# Patient Record
Sex: FEMALE
[De-identification: names, ages, dates, MRNs, and addresses within clinical notes are randomized per-mention and may not be internally consistent; named-entity substitution may affect disease eponyms.]

---

## 2017-09-01 ENCOUNTER — HOSPITAL ENCOUNTER (INPATIENT)
Dept: HOSPITAL 14 - H.EROB2 | Age: 27
LOS: 2 days | Discharge: HOME | DRG: 373 | End: 2017-09-03
Attending: SPECIALIST | Admitting: SPECIALIST
Payer: COMMERCIAL

## 2017-09-01 VITALS — BODY MASS INDEX: 49.3 KG/M2

## 2017-09-01 DIAGNOSIS — Z3A.38: ICD-10-CM

## 2017-09-01 LAB
BASOPHILS # BLD AUTO: 0 K/UL (ref 0–0.2)
BASOPHILS NFR BLD: 0.3 % (ref 0–2)
EOSINOPHIL # BLD AUTO: 0 K/UL (ref 0–0.7)
EOSINOPHIL NFR BLD: 0.5 % (ref 0–4)
ERYTHROCYTE [DISTWIDTH] IN BLOOD BY AUTOMATED COUNT: 12.8 % (ref 11.5–14.5)
HCT VFR BLD CALC: 40.2 % (ref 34–47)
LYMPHOCYTES # BLD AUTO: 2.1 K/UL (ref 1–4.3)
LYMPHOCYTES NFR BLD AUTO: 23.8 % (ref 20–40)
MCH RBC QN AUTO: 31 PG (ref 27–31)
MCHC RBC AUTO-ENTMCNC: 32.8 G/DL (ref 33–37)
MCV RBC AUTO: 94.6 FL (ref 81–99)
MONOCYTES # BLD: 0.7 K/UL (ref 0–0.8)
MONOCYTES NFR BLD: 7.7 % (ref 0–10)
NEUTROPHILS # BLD: 5.9 K/UL (ref 1.8–7)
NEUTROPHILS NFR BLD AUTO: 67.7 % (ref 50–75)
NRBC BLD AUTO-RTO: 0.1 % (ref 0–0)
PLATELET # BLD: 196 K/UL (ref 130–400)
PMV BLD AUTO: 8.4 FL (ref 7.2–11.7)
WBC # BLD AUTO: 8.7 K/UL (ref 4.8–10.8)

## 2017-09-01 PROCEDURE — 4A1HXCZ MONITORING OF PRODUCTS OF CONCEPTION, CARDIAC RATE, EXTERNAL APPROACH: ICD-10-PCS | Performed by: SPECIALIST

## 2017-09-01 NOTE — OBADHP
===========================

Datetime: 09/01/2017 18:43

===========================

   

Admit Comment, IP Provider:  iup term srom in labor uneventful prenatal course admit for delivery

Pelvic Type - PN:  Adequate

Extremities - PN:  Normal

Abdomen - PN:  Normal

Back - PN:  Normal

Breast - PN:  Normal

Lungs - PN:  Normal

Heart - PN:  Normal

Thyroid - PN:  Normal

Neurologic - PN:  Normal

HEENT - PN:  Normal

General - PN:  Normal

Fetal Presentation-Admit:  Vertex

FHR - Baseline A Provider:  140

Gestation - Est Wks by US:  38+

Pool Provider:  Positive

IP Chief Complaint:  Uterine contractions

NICHD Variability Prov Fetus A:  Moderate 6-25bpm

NICHD Accel Fetus A IP Provider:  10X10

FHR Category Provider Fetus A:  Category I

NICHD Decel Fetus A IP Provider:  None

Dilatation, Provider:  3-4

Effacement, Provider:  75%

Station, Provider:  -1

Genitourinary Exam:  Normal

DTRs - PN:  Normal

EGA AdmitDate IP:  38.6

IP Adm Impression:  Term, intrauterine pregnancy; Ruptured Membranes

IP Admit Plan:  Admit to unit; Initiate labor protocol

## 2017-09-02 LAB
ERYTHROCYTE [DISTWIDTH] IN BLOOD BY AUTOMATED COUNT: 12.7 % (ref 11.5–14.5)
HCT VFR BLD CALC: 34.2 % (ref 34–47)
MCH RBC QN AUTO: 30.9 PG (ref 27–31)
MCHC RBC AUTO-ENTMCNC: 32.6 G/DL (ref 33–37)
MCV RBC AUTO: 94.6 FL (ref 81–99)
PLATELET # BLD: 176 K/UL (ref 130–400)
WBC # BLD AUTO: 13.4 K/UL (ref 4.8–10.8)

## 2017-09-02 RX ADMIN — PRENATAL VIT W/ FE FUMARATE-FA TAB 27-0.8 MG SCH TAB: 27-0.8 TAB at 08:24

## 2017-09-02 NOTE — OBPPN
===========================

Datetime: 09/02/2017 13:32

===========================

   

PP Pain Prov:  Within normal limits

PP Nausea Prov:  Denies

PP Flatus Prov:  Yes

PP BM Prov:  No

PP Breasts Prov:  Normal

PP Heart Prov:  Normal

PP Lungs Prov:  Normal

PP Abdomen/Uterus Prov:  Normal

PP Lochia Prov:  Normal

PP Vulva/Perineum Prov:  Normal

PP CVA Tenderness Prov:  Normal

PP Extremities Prov:  Normal

PP Breastfeeding Progress Prov:  Normal

PP Impression Prov:  Normal postpartum progression

PP Plan Prov:  Continue present management

PP Progress Note Prov:  stable ppd1 continue present care,may shower,regular diet

IP PP Procedures:  None

## 2017-09-03 VITALS
SYSTOLIC BLOOD PRESSURE: 130 MMHG | TEMPERATURE: 98.9 F | HEART RATE: 85 BPM | OXYGEN SATURATION: 97 % | DIASTOLIC BLOOD PRESSURE: 76 MMHG | RESPIRATION RATE: 20 BRPM

## 2017-09-03 LAB
ERYTHROCYTE [DISTWIDTH] IN BLOOD BY AUTOMATED COUNT: 12.9 % (ref 11.5–14.5)
HCT VFR BLD CALC: 37.7 % (ref 34–47)
MCH RBC QN AUTO: 31.9 PG (ref 27–31)
MCHC RBC AUTO-ENTMCNC: 33.7 G/DL (ref 33–37)
MCV RBC AUTO: 94.6 FL (ref 81–99)
PLATELET # BLD: 180 K/UL (ref 130–400)
WBC # BLD AUTO: 9.2 K/UL (ref 4.8–10.8)

## 2017-09-03 RX ADMIN — PRENATAL VIT W/ FE FUMARATE-FA TAB 27-0.8 MG SCH TAB: 27-0.8 TAB at 09:26

## 2017-09-03 NOTE — OBDCSUM
===========================

Datetime: 09/03/2017 10:47

===========================

   

Discharged to, Provider:  Home

Follow up at, Provider:  dr. SERRATO

Disch Instr Activity:  Bedrest; May be up to bathroom; May be up for meals; May Shower

Disch Instr Diet:  Regular

Discharge Instructions, Provider:  Routine instructions given

Discharge Diagnosis, Provider:  Term Pregnancy Delivered

Discharge Time:  09/03/2017 10:47

Follow up in weeks, Provider:  4-6 WKS

Disch Referrals:  None

Contraception discussed, Prov:  Yes

Disch Activity Restrictions:  No exercising; No lifting; No driving; Minimize walking; Minimize stair
-climbing; No sexual activity; Nothing in vagina - Hitchcock, tampons, douche

Discharge Comment, Provider:  CONTINUE PP CARE PELVIC AND BED REST AND CONTINUE pnc VIT AND IRON

Contraception after Delivery:  Undecided

## 2017-09-03 NOTE — OBPPN
===========================

Datetime: 09/03/2017 10:42

===========================

   

PP Pain Prov:  Within normal limits

PP Pain Prov comment:  No SOB, chest pains or leg pains

PP Nausea Prov:  Denies

PP Flatus Prov:  No

PP Breasts Prov:  Normal

PP Lungs Prov:  Normal

PP Abdomen/Uterus Prov:  Abnormal

PP Lochia Prov:  Normal

PP CVA Tenderness Prov:  Normal

PP Extremities Prov:  Abnormal

PP C/S Incision Prov:  Not Applicable

PP Breastfeeding Progress Prov:  Not Applicable

PP Comments Phys Exam Prov:  breast not engorged, NT, Abd soft not distended fundus firm below the um
b.  NT Ext mild edema bilaterally but no calf tenderness Skin changes from eczema.

PP Impression Prov:  Normal postpartum progression

PP Plan Prov:  Discharge

PP Progress Note Prov:  D/c home and appt to see dr Myers  in 4-6 wks continue PP care pelvic and bed r
est

IP PP Procedures:  None

Vital Signs Provider PP:  Reviewed

## 2018-03-31 ENCOUNTER — HOSPITAL ENCOUNTER (INPATIENT)
Dept: HOSPITAL 14 - H.ER | Age: 28
LOS: 5 days | Discharge: HOME | DRG: 494 | End: 2018-04-05
Attending: INTERNAL MEDICINE | Admitting: INTERNAL MEDICINE
Payer: MEDICAID

## 2018-03-31 VITALS — BODY MASS INDEX: 49.3 KG/M2

## 2018-03-31 DIAGNOSIS — F41.9: ICD-10-CM

## 2018-03-31 DIAGNOSIS — L40.9: ICD-10-CM

## 2018-03-31 DIAGNOSIS — G47.33: ICD-10-CM

## 2018-03-31 DIAGNOSIS — K80.00: Primary | ICD-10-CM

## 2018-03-31 DIAGNOSIS — Z85.72: ICD-10-CM

## 2018-03-31 DIAGNOSIS — E66.9: ICD-10-CM

## 2018-03-31 LAB
ALBUMIN SERPL-MCNC: 3.9 G/DL (ref 3.5–5)
ALBUMIN/GLOB SERPL: 1 {RATIO} (ref 1–2.1)
ALT SERPL-CCNC: 32 U/L (ref 9–52)
AST SERPL-CCNC: 32 U/L (ref 14–36)
BACTERIA #/AREA URNS HPF: (no result) /[HPF]
BASOPHILS # BLD AUTO: 0 K/UL (ref 0–0.2)
BASOPHILS NFR BLD: 0.3 % (ref 0–2)
BILIRUB UR-MCNC: NEGATIVE MG/DL
BUN SERPL-MCNC: 11 MG/DL (ref 7–17)
CALCIUM SERPL-MCNC: 9.2 MG/DL (ref 8.4–10.2)
COLOR UR: YELLOW
EOSINOPHIL # BLD AUTO: 0 K/UL (ref 0–0.7)
EOSINOPHIL NFR BLD: 0.4 % (ref 0–4)
ERYTHROCYTE [DISTWIDTH] IN BLOOD BY AUTOMATED COUNT: 11.5 % (ref 11.5–14.5)
GFR NON-AFRICAN AMERICAN: > 60
GLUCOSE UR STRIP-MCNC: (no result) MG/DL
HGB BLD-MCNC: 12.9 G/DL (ref 12–16)
LEUKOCYTE ESTERASE UR-ACNC: (no result) LEU/UL
LYMPHOCYTES # BLD AUTO: 2 K/UL (ref 1–4.3)
LYMPHOCYTES NFR BLD AUTO: 26.1 % (ref 20–40)
MCH RBC QN AUTO: 31.9 PG (ref 27–31)
MCHC RBC AUTO-ENTMCNC: 35.1 G/DL (ref 33–37)
MCV RBC AUTO: 90.7 FL (ref 81–99)
MONOCYTES # BLD: 0.6 K/UL (ref 0–0.8)
MONOCYTES NFR BLD: 8.1 % (ref 0–10)
NEUTROPHILS # BLD: 5.1 K/UL (ref 1.8–7)
NEUTROPHILS NFR BLD AUTO: 65.1 % (ref 50–75)
NRBC BLD AUTO-RTO: 0.1 % (ref 0–0)
PH UR STRIP: 6 [PH] (ref 5–8)
PLATELET # BLD: 260 K/UL (ref 130–400)
PMV BLD AUTO: 7.9 FL (ref 7.2–11.7)
PROT UR STRIP-MCNC: 30 MG/DL
RBC # BLD AUTO: 4.06 MIL/UL (ref 3.8–5.2)
RBC # UR STRIP: (no result) /UL
SP GR UR STRIP: 1.03 (ref 1–1.03)
SQUAMOUS EPITHIAL: 3 /HPF (ref 0–5)
URINE CLARITY: (no result)
UROBILINOGEN UR-MCNC: (no result) MG/DL (ref 0.2–1)
WBC # BLD AUTO: 7.8 K/UL (ref 4.8–10.8)

## 2018-03-31 NOTE — US
HISTORY:

RUQ pain



COMPARISON:

Correlations made to CT scan of the abdomen pelvis performed 

approximately 7 hours prior.



TECHNIQUE:

Sonographic evaluation of the right upper quadrant of the abdomen.



FINDINGS:



LIVER:

Mildly enlarged, measuring 17.1 cm in length. Normal echogenicity of 

the liver parenchyma.  No mass. No intrahepatic bile duct dilatation. 



GALLBLADDER:

Cholelithiasis without gallbladder wall thickening or edema.  

Sonographic Tovar's sign was elicited. 



COMMON BILE DUCT:

Measures 4 mm.  No stones. No dilatation.



PANCREAS:

Not well-visualized.



AORTA:

No aneurysmal dilatation.



IVC:

Unremarkable.



OTHER FINDINGS:

None .



IMPRESSION:

Cholelithiasis without gallbladder wall thickening/ edema or 

pericholecystic fluid.  Sonographic Tovar's sign was, however, 

elicited.  Findings are equivocal for acute cholecystitis, although 

recent CT scan of the abdomen pelvis did not reveal gallbladder wall 

thickening or pericholecystic edema/inflammation.  Nuclear medicine 

HIDA scan can be obtained to further evaluate patency of the cystic 

duct as clinically warranted.



Borderline hepatomegaly.

## 2018-03-31 NOTE — CP.PCM.CON
History of Present Illness





- History of Present Illness


History of Present Illness: 





General Surgery Consult Note for Dr. Chacko


Reason for Consult: Right flank/RUQ pain





28 F  with PMH of JOSEPHINE, psoriaisis, depression/anxiety presents to [facility] for 

complaint of Right flank/RUQ pain. Patient was seen and evaluated in the ED. 

Koki states that the pain began around 1 am, which woke her up from sleep. 

Patient reports that she ate dinner last night and felt fine before going to 

bed. She states that she has never has had pain like this before. She reports 

sudden onset. Upon arrival to ED, she states that she had an episode of nausea/

vomiting (non-bloody, non-bilious emesis). She rates that pain as severe. She 

describes pain as constant and sharp located in right flank radiating to RUQ. 

She reports that certain movements and palpatino exacerbates her pain while 

nothing alleviates it. Denies recent illness or sick contacts. Admits to 

chills. Denies suicidal/homicial ideations, fever/chills, palpitations, chest 

pain, SOB, diarrhea, constipation, or urinary symptoms.





PMD: Dr. Moses





PMH: JOSEPHINE, psoriaisis, depression/anxiety


Meds: Humera


Allergy: NKDA


PSH: lipoma removal


FH: non-contributory


Social: denies tobacco/EtOH/illicit drug use, lives with  and child





Review of Systems





- Review of Systems


All systems: reviewed and no additional remarkable complaints except (12 point 

ROS negative unless otherwise stated as per HPI)





Past Patient History





- Past Medical History & Family History


Past Medical History?: Yes





- Past Social History


Alcohol: Occasional


Drugs: Denies





- CARDIAC


Hx Hypertension: No





- PULMONARY


Hx Sleep Apnea: Yes





- NEUROLOGICAL


Hx Neurological Disorder: No





- HEENT


Other/Comment: Use Eyeglasses for reading





- RENAL


Hx Chronic Kidney Disease: No





- ENDOCRINE/METABOLIC


Hx Endocrine Disorders: Yes (thyroid issue, no meds)





- HEMATOLOGICAL/ONCOLOGICAL


Hx Blood Disorders: No





- INTEGUMENTARY


Hx Psoriasis: Yes





- MUSCULOSKELETAL/RHEUMATOLOGICAL


Hx Musculoskeletal Disorders: No


Hx Falls: No





- GASTROINTESTINAL


Hx Gastrointestinal Disorders: No





- GENITOURINARY/GYNECOLOGICAL


Hx Genitourinary Disorders: No





- PSYCHIATRIC


Hx Depression: No





- SURGICAL HISTORY


Hx Surgeries: Yes (lipoma removal from head)


Other/Comment: Removal of cyst on the tailbone X2





- ANESTHESIA


Hx Anesthesia: Yes





Meds


Allergies/Adverse Reactions: 


 Allergies











Allergy/AdvReac Type Severity Reaction Status Date / Time


 


No Known Allergies Allergy   Verified 17 07:25














Physical Exam





- Constitutional


Appears: Non-toxic, No Acute Distress





- Head Exam


Head Exam: ATRAUMATIC, NORMOCEPHALIC





- Eye Exam


Eye Exam: EOMI, Normal appearance


Pupil Exam: PERRL





- ENT Exam


ENT Exam: Mucous Membranes Moist





- Neck Exam


Neck exam: Positive for: Full Rom.  Negative for: Tenderness





- Respiratory Exam


Respiratory Exam: NORMAL BREATHING PATTERN





- Cardiovascular Exam


Cardiovascular Exam: REGULAR RHYTHM





- GI/Abdominal Exam


GI & Abdominal Exam: Normal Bowel Sounds, Soft, Tenderness (RUQ).  absent: 

Distended, Firm, Guarding, Hernia, Rebound, Rigid





- Extremities Exam


Extremities exam: Positive for: normal capillary refill, pedal pulses present.  

Negative for: calf tenderness





- Back Exam


Back exam: absent: CVA tenderness (L), CVA tenderness (R)





- Neurological Exam


Neurological exam: Alert, CN II-XII Intact, Oriented x3





- Psychiatric Exam


Psychiatric exam: Normal Affect, Normal Mood





- Skin


Skin Exam: Dry, Intact, Normal Color, Warm





Results





- Vital Signs


Recent Vital Signs: 


 Last Vital Signs











Temp  98.3 F   18 02:29


 


Pulse  79   18 02:29


 


Resp  18   18 02:29


 


BP  119/69   18 02:29


 


Pulse Ox  99   18 04:50














- Labs


Result Diagrams: 


 18 03:00





 18 03:00


Labs: 


 Laboratory Results - last 24 hr











  18





  03:00 03:00 03:00


 


WBC  7.8  


 


RBC  4.06  


 


Hgb  12.9  


 


Hct  36.8  


 


MCV  90.7  D  


 


MCH  31.9 H  


 


MCHC  35.1  


 


RDW  11.5  


 


Plt Count  260  


 


MPV  7.9  


 


Neut % (Auto)  65.1  


 


Lymph % (Auto)  26.1  


 


Mono % (Auto)  8.1  


 


Eos % (Auto)  0.4  


 


Baso % (Auto)  0.3  


 


Neut # (Auto)  5.1  


 


Lymph # (Auto)  2.0  


 


Mono # (Auto)  0.6  


 


Eos # (Auto)  0.0  


 


Baso # (Auto)  0.0  


 


D-Dimer, Quantitative    247 H


 


Sodium   142 


 


Potassium   3.8 


 


Chloride   101 


 


Carbon Dioxide   26 


 


Anion Gap   19 


 


BUN   11 


 


Creatinine   0.8 


 


Est GFR ( Amer)   > 60 


 


Est GFR (Non-Af Amer)   > 60 


 


Random Glucose   125 H 


 


Calcium   9.2 


 


Phosphorus   3.1 


 


Magnesium   1.8 


 


Total Bilirubin   0.5 


 


AST   32 


 


ALT   32 


 


Alkaline Phosphatase   81 


 


Total Protein   7.8 


 


Albumin   3.9 


 


Globulin   3.9 


 


Albumin/Globulin Ratio   1.0 


 


Urine Opiates Screen   


 


Urine Methadone Screen   


 


Ur Barbiturates Screen   


 


Ur Phencyclidine Scrn   


 


Ur Amphetamines Screen   


 


U Benzodiazepines Scrn   


 


U Oth Cocaine Metabols   


 


U Cannabinoids Screen   


 


Alcohol, Quantitative   < 10 














  18





  03:00


 


WBC 


 


RBC 


 


Hgb 


 


Hct 


 


MCV 


 


MCH 


 


MCHC 


 


RDW 


 


Plt Count 


 


MPV 


 


Neut % (Auto) 


 


Lymph % (Auto) 


 


Mono % (Auto) 


 


Eos % (Auto) 


 


Baso % (Auto) 


 


Neut # (Auto) 


 


Lymph # (Auto) 


 


Mono # (Auto) 


 


Eos # (Auto) 


 


Baso # (Auto) 


 


D-Dimer, Quantitative 


 


Sodium 


 


Potassium 


 


Chloride 


 


Carbon Dioxide 


 


Anion Gap 


 


BUN 


 


Creatinine 


 


Est GFR ( Amer) 


 


Est GFR (Non-Af Amer) 


 


Random Glucose 


 


Calcium 


 


Phosphorus 


 


Magnesium 


 


Total Bilirubin 


 


AST 


 


ALT 


 


Alkaline Phosphatase 


 


Total Protein 


 


Albumin 


 


Globulin 


 


Albumin/Globulin Ratio 


 


Urine Opiates Screen  Negative


 


Urine Methadone Screen  Negative


 


Ur Barbiturates Screen  Negative


 


Ur Phencyclidine Scrn  Negative


 


Ur Amphetamines Screen  Negative


 


U Benzodiazepines Scrn  Negative


 


U Oth Cocaine Metabols  Negative


 


U Cannabinoids Screen  Negative


 


Alcohol, Quantitative 














Assessment & Plan





- Assessment and Plan (Free Text)


Assessment: 





28 F with right flank/RUQ abdominal pain; CT abd/pelvis demonstrates suspicion 

for SBO


Plan: 





-NPO


-IV fluids


-f/u RUQ US


-f/u Renal US


-Analgesics/Anti-emetics PRN


-Patient refuses NGT at this time


-Recommend Psych consult


-Further recommendations as per Dr. Ricco Parker PGY1

## 2018-03-31 NOTE — ED PDOC
HPI: Back


Time Seen by Provider: 03/31/18 02:29


Chief Complaint (Nursing): Back Pain


Chief Complaint (Provider): right flank pain


History Per: Patient


History/Exam Limitations: no limitations


Onset/Duration Of Symptoms: Hrs (x2), Sudden Onset


Current Symptoms Are (Timing): Still Present


Quality Of Discomfort: Sharp


Additional Complaint(s): 


Rebecca Gilbert is a 28 year old female with a past medical history of 

psoriasis, lipoma, and pilonidal abscess, who is presenting to the ER with 

complaints of sudden onset, sharp, non-radiating right flank pain, onset at 1 

am this morning while patient was sleeping. Patient states that she fekt fine 

when she went to bed last night but awoke with the pain. She reports that the 

pain is worsened with certain movements of the trunk and notes associated 

nausea. Patient denies any vomiting, diarrhea, or urinary symptoms. 





PMD: Dr. Bordie Moses











Past Medical History


Reviewed: Historical Data, Nursing Documentation, Vital Signs


Vital Signs: 


 Last Vital Signs











Temp  98.3 F   03/31/18 02:29


 


Pulse  79   03/31/18 02:29


 


Resp  18   03/31/18 02:29


 


BP  119/69   03/31/18 02:29


 


Pulse Ox  99   03/31/18 02:29














- Medical History


PMH: Sleep Apnea


   Denies: Depression, Diabetes, HTN, Chronic Kidney Disease


Other PMH: psoriasis, pilonidal abscess, lipoma





- Surgical History


Surgical History: No Surg Hx





- Family History


Family History: States: Unknown Family Hx





- Social History


Current smoker - smoking cessation education provided: No


Alcohol: Occasional


Drugs: Denies





- Home Medications


Home Medications: 


 Ambulatory Orders











 Medication  Instructions  Recorded


 


Pantoprazole Sodium [Protonix] 40 mg PO DAILY #14 ect 04/05/18


 


oxyCODONE/Acetaminophen [Percocet 1 tab PO Q4 PRN #14 tab 04/05/18





5/325 mg Tab]  














- Allergies


Allergies/Adverse Reactions: 


 Allergies











Allergy/AdvReac Type Severity Reaction Status Date / Time


 


No Known Allergies Allergy   Verified 04/04/18 11:54














Review of Systems


ROS Statement: Except As Marked, All Systems Reviewed And Found Negative


Gastrointestinal: Positive for: Nausea.  Negative for: Vomiting, Diarrhea


Genitourinary Female: Negative for: Other (urinary symptoms)


Musculoskeletal: Positive for: Back Pain (right flank)


Skin: Positive for: Rash (from psoriasis)





Physical Exam





- Reviewed


Nursing Documentation Reviewed: Yes


Vital Signs Reviewed: Yes





- Physical Exam


Appears: Positive for: Uncomfortable, In Acute Distress (painful)


Head Exam: Positive for: ATRAUMATIC, NORMOCEPHALIC


Skin: Positive for: Warm, Dry, Rash (diffuse psoriatic lesions)


Eye Exam: Positive for: EOMI, PERRL


ENT: Negative for: Pharyngeal Erythema, Tonsillar Exudate


Neck: Positive for: Painless ROM


Cardiovascular/Chest: Positive for: Regular Rate, Rhythm.  Negative for: Murmur


Respiratory: Positive for: Normal Breath Sounds.  Negative for: Wheezing


Gastrointestinal/Abdominal: Positive for: Normal Exam, Soft, Other (obese).  

Negative for: Tenderness, Mass, Distended, Guarding, Rebound


Back: Negative for: L CVA Tenderness, R CVA Tenderness, Other (bony tenderness 

to palpation)


Extremity: Positive for: Normal ROM.  Negative for: Deformity


Lymphatic: Negative for: Adenopathy


Neurologic/Psych: Positive for: Alert.  Negative for: Motor/Sensory Deficits





- Laboratory Results


Result Diagrams: 


 04/02/18 08:00





 04/05/18 05:30


Urine dip results: Positive for: Blood (trace lysed).  Negative for: Leukocyte 

Esterase, Nitrate, Ketones, Glucose, Bilirubin, Protein





- ECG


O2 Sat by Pulse Oximetry: 99 (RA)


Pulse Ox Interpretation: Normal





Medical Decision Making


Medical Decision Making: 


Time: 2:47


Impression: right flank pain, vomiting


Differentials (including but not limited to): renal polyp, pyelonephritis, 

musculoskeletal strain, gall bladder disease, PE


Plan:


--CT Abd & Pelvis


--Alcohol Serum


--CMP


--Drug Screen


--Magnesium


--Phosphorous


--ED Urine Pregnancy


--ED Urine Dipstick


--CBC


--D Dimer


--Toradol 30 mg IV


--Zofran 4 mg PO


 EXAM:


CT Abdomen and Pelvis Without Intravenous Contrast


CLINICAL HISTORY:


28 years old, female; Pain; Abdominal pain; Flank; Right; Additional info: 

Right flank pain


TECHNIQUE:


Axial computed tomography images of the abdomen and pelvis without intravenous 

contrast. All CT


scans at this facility use one or more dose reduction techniques, viz.: 

automated exposure control;


ma/kV adjustment per patient size (including targeted exams where dose is 

matched to indication; i.e.


head); or iterative reconstruction technique. 679 images are submitted.Sagittal 

, axial and coronal


MPR reformatted images are submitted.


COMPARISON:


PELVIS/TRANSVAG US 2016-02-05 15:45


FINDINGS:


Lung bases: There is bibasilar atelectasis.


Mediastinum: Small hiatal hernia.


ABDOMEN:


Liver: Unremarkable.


Gallbladder and bile ducts: Partially distended gallbladder which is folded on 

itself.


Pancreas: Unremarkable. No ductal dilation.


Spleen: Unremarkable. No splenomegaly.


Adrenals: Unremarkable. No mass.


Kidneys and ureters: Unremarkable. No obstructing stones. No hydronephrosis.


Stomach and bowel: There are dilated mid small bowel loops with transition in 

the mid lower


abdomen below the umbilicus seen on image 127 series 3 suspicious for small 

bowel obstruction.


Duodenal diverticulum. Moderate amount of stool in the colon. Rectosigmoid 

distention with stool


and colonic thickening.Correlation with patient's clinical history of 

constipation versus stool related


colitis versus under distention is recommended. Diverticulosis.


Appendix: Suboptimally seen normal appendix.


PELVIS:


Bladder: Partially decompressed bladder with bladder wall thickening. 

Correlation with urinalysis is


recommended only if clinical cystitis is suspected.


Reproductive: High riding ovaries. Uterus is seen.


ABDOMEN and PELVIS:


Intraperitoneal space: Unremarkable. No free air. No significant fluid 

collection.


Bones/joints: Right sided thoracic and left-sided lower thoracic scoliosis. No 

acute fracture. No


dislocation.


Soft tissues: There is a fat-containing umbilical hernia.


Vasculature: Unremarkable. No abdominal aortic aneurysm.


Lymph nodes: Unremarkable. No enlarged lymph nodes.


Other findings: CRITICAL RESULT: The study was personally discussed on the 

telephone with Rosalind Graves on 3/31/2018 4:37 AM EDT. The results were understood and 

acknowledged.


IMPRESSION:


There are dilated mid small bowel loops with transition in the mid lower 

abdomen below the


umbilicus seen on image 127 series 3 suspicious for small bowel obstruction.


Thank you for allowing us to participate in the care of your patient.


Dictated and Authenticated by: Radha Ascencio MD


03/31/2018 4:39 AM Eastern Time ([place] & [place])


 


On reeval pt reports pain to RIGHT flank improved, but has RUQ ttp on exam. And 

reports that the RUQ palpation suddenly made her stomach "feel weird."


Will consult Surgery and place in observation for SBO.





--------------------------------------------------------------------------------

-----------------


Scribe Attestation:


Documented by Melissa Anguiano acting as a scribe for Rosalind Robin MD.


   


MD Scribe Attestation:


All medical record entries made by the Scribe were at my direction and 

personally dictated by me. I have reviewed the chart and agree that the record 

accurately reflects my personal performance of the history, physical exam, 

medical decision making, and the department course for this patient. I have 

also personally directed, reviewed, and agree with the discharge instructions 

and disposition.











Disposition





- Clinical Impression


Clinical Impression: 


 SBO (small bowel obstruction)





Discussed With DrNarendra: Ibeth Wharton


Doctor Will See Patient In The: Hospital


Counseled Patient/Family Regarding: Studies Performed, Diagnosis





- Disposition


Disposition Time: 06:00


Condition: FAIR





- Pt Status Changed To:


Hospital Disposition Of: Observation





- POA


Present On Arrival: None

## 2018-03-31 NOTE — CP.PCM.HP
History of Present Illness





- History of Present Illness


History of Present Illness: 


Cc: Right flank pain





 A 28 year old female with a pmhx of psoriasis, lipoma, and pilonidal abscess 

who presented to the ED with complaints of sudden onset of sharp pain to her 

right flank that woke her up at about 1 am this morning. States the pain is non-

radiating, constant and severe with a 8/10 intensity. States she felt fine when 

she went to bed last night but then woke up with the pain. Reports that pain is 

worsened with certain movements of her body and sometimes associated nausea. 

Denies any vomiting, diarrhea, urinary symptoms, fever, headache, dizziness, 

chest pain or shortness of breath. 








Present on Admission





- Present on Admission


Any Indicators Present on Admission: No





Review of Systems





- Review of Systems


All systems: reviewed and no additional remarkable complaints except (as stated)





- Constitutional


Constitutional: As Per HPI





- Cardiovascular


Cardiovascular: As Per HPI





- Respiratory


Respiratory: As Per HPI





- Gastrointestinal


Gastrointestinal: As Per HPI, Abdominal Pain, Nausea





Past Patient History





- Infectious Disease


Hx of Infectious Diseases: None





- Past Medical History & Family History


Past Medical History?: Yes


Past Family History: Reviewed and not pertinent





- Past Social History


Smoking Status: Never Smoked





- CARDIAC


Hx Hypertension: No





- PULMONARY


Hx Sleep Apnea: Yes





- NEUROLOGICAL


Hx Neurological Disorder: No





- HEENT


Hx HEENT Problems: No


Other/Comment: Use Eyeglasses for reading





- RENAL


Hx Chronic Kidney Disease: No





- ENDOCRINE/METABOLIC


Hx Endocrine Disorders: Yes (thyroid issue, no meds)





- HEMATOLOGICAL/ONCOLOGICAL


Hx Blood Disorders: No





- INTEGUMENTARY


Hx Psoriasis: Yes





- MUSCULOSKELETAL/RHEUMATOLOGICAL


Hx Musculoskeletal Disorders: No


Hx Falls: No





- GASTROINTESTINAL


Hx Gastrointestinal Disorders: No





- GENITOURINARY/GYNECOLOGICAL


Hx Genitourinary Disorders: No





- PSYCHIATRIC


Hx Depression: No


Hx Substance Use: No





- SURGICAL HISTORY


Hx Surgeries: Yes (lipoma removal from head)


Other/Comment: Removal of cyst on the tailbone X2





- ANESTHESIA


Hx Anesthesia: Yes





Meds


Allergies/Adverse Reactions: 


 Allergies











Allergy/AdvReac Type Severity Reaction Status Date / Time


 


No Known Allergies Allergy   Verified 09/01/17 07:25














Physical Exam





- Constitutional


Appears: Well, No Acute Distress





- Head Exam


Head Exam: ATRAUMATIC, NORMOCEPHALIC





- Eye Exam


Eye Exam: EOMI, Normal appearance, PERRL


Pupil Exam: NORMAL ACCOMODATION





- ENT Exam


ENT Exam: Mucous Membranes Moist, Normal Exam





- Neck Exam


Neck exam: Positive for: Normal Inspection





- Respiratory Exam


Respiratory Exam: Clear to Auscultation Bilateral, NORMAL BREATHING PATTERN





- Cardiovascular Exam


Cardiovascular Exam: REGULAR RHYTHM, +S1, +S2





- GI/Abdominal Exam


GI & Abdominal Exam: Normal Bowel Sounds, Soft





- Extremities Exam


Extremities exam: Positive for: normal inspection





- Back Exam


Back exam: NORMAL INSPECTION





- Neurological Exam


Neurological exam: Alert, CN II-XII Intact, Oriented x3, Reflexes Normal





- Psychiatric Exam


Psychiatric exam: Normal Affect, Normal Mood





- Skin


Skin Exam: Normal Color, Warm





Results





- Vital Signs


Recent Vital Signs: 





 Last Vital Signs











Temp  97.9 F   03/31/18 10:59


 


Pulse  72   03/31/18 11:30


 


Resp  18   03/31/18 11:30


 


BP  132/78   03/31/18 10:59


 


Pulse Ox  100   03/31/18 11:30














- Labs


Result Diagrams: 


 04/02/18 08:00





 04/03/18 09:05


Labs: 





 Laboratory Results - last 24 hr











  03/31/18 03/31/18 03/31/18





  03:00 03:00 03:00


 


WBC  7.8  


 


RBC  4.06  


 


Hgb  12.9  


 


Hct  36.8  


 


MCV  90.7  D  


 


MCH  31.9 H  


 


MCHC  35.1  


 


RDW  11.5  


 


Plt Count  260  


 


MPV  7.9  


 


Neut % (Auto)  65.1  


 


Lymph % (Auto)  26.1  


 


Mono % (Auto)  8.1  


 


Eos % (Auto)  0.4  


 


Baso % (Auto)  0.3  


 


Neut # (Auto)  5.1  


 


Lymph # (Auto)  2.0  


 


Mono # (Auto)  0.6  


 


Eos # (Auto)  0.0  


 


Baso # (Auto)  0.0  


 


D-Dimer, Quantitative    247 H


 


Sodium   142 


 


Potassium   3.8 


 


Chloride   101 


 


Carbon Dioxide   26 


 


Anion Gap   19 


 


BUN   11 


 


Creatinine   0.8 


 


Est GFR ( Amer)   > 60 


 


Est GFR (Non-Af Amer)   > 60 


 


Random Glucose   125 H 


 


Calcium   9.2 


 


Phosphorus   3.1 


 


Magnesium   1.8 


 


Total Bilirubin   0.5 


 


AST   32 


 


ALT   32 


 


Alkaline Phosphatase   81 


 


Total Protein   7.8 


 


Albumin   3.9 


 


Globulin   3.9 


 


Albumin/Globulin Ratio   1.0 


 


Urine Color   


 


Urine Clarity   


 


Urine pH   


 


Ur Specific Gravity   


 


Urine Protein   


 


Urine Glucose (UA)   


 


Urine Ketones   


 


Urine Blood   


 


Urine Nitrate   


 


Urine Bilirubin   


 


Urine Urobilinogen   


 


Ur Leukocyte Esterase   


 


Urine RBC (Auto)   


 


Urine Microscopic WBC   


 


Ur Squamous Epith Cells   


 


Urine Bacteria   


 


Urine Opiates Screen   


 


Urine Methadone Screen   


 


Ur Barbiturates Screen   


 


Ur Phencyclidine Scrn   


 


Ur Amphetamines Screen   


 


U Benzodiazepines Scrn   


 


U Oth Cocaine Metabols   


 


U Cannabinoids Screen   


 


Alcohol, Quantitative   < 10 














  03/31/18 03/31/18





  03:00 06:17


 


WBC  


 


RBC  


 


Hgb  


 


Hct  


 


MCV  


 


MCH  


 


MCHC  


 


RDW  


 


Plt Count  


 


MPV  


 


Neut % (Auto)  


 


Lymph % (Auto)  


 


Mono % (Auto)  


 


Eos % (Auto)  


 


Baso % (Auto)  


 


Neut # (Auto)  


 


Lymph # (Auto)  


 


Mono # (Auto)  


 


Eos # (Auto)  


 


Baso # (Auto)  


 


D-Dimer, Quantitative  


 


Sodium  


 


Potassium  


 


Chloride  


 


Carbon Dioxide  


 


Anion Gap  


 


BUN  


 


Creatinine  


 


Est GFR ( Amer)  


 


Est GFR (Non-Af Amer)  


 


Random Glucose  


 


Calcium  


 


Phosphorus  


 


Magnesium  


 


Total Bilirubin  


 


AST  


 


ALT  


 


Alkaline Phosphatase  


 


Total Protein  


 


Albumin  


 


Globulin  


 


Albumin/Globulin Ratio  


 


Urine Color   Yellow


 


Urine Clarity   Slighty-cloudy


 


Urine pH   6.0


 


Ur Specific Gravity   1.028


 


Urine Protein   30


 


Urine Glucose (UA)   Neg


 


Urine Ketones   Negative


 


Urine Blood   Small


 


Urine Nitrate   Negative


 


Urine Bilirubin   Negative


 


Urine Urobilinogen   0.2-1.0


 


Ur Leukocyte Esterase   Neg


 


Urine RBC (Auto)   5 H


 


Urine Microscopic WBC   6 H


 


Ur Squamous Epith Cells   3


 


Urine Bacteria   Rare


 


Urine Opiates Screen  Negative 


 


Urine Methadone Screen  Negative 


 


Ur Barbiturates Screen  Negative 


 


Ur Phencyclidine Scrn  Negative 


 


Ur Amphetamines Screen  Negative 


 


U Benzodiazepines Scrn  Negative 


 


U Oth Cocaine Metabols  Negative 


 


U Cannabinoids Screen  Negative 


 


Alcohol, Quantitative  














- Imaging and Cardiology


  ** CT Abd/Pelvis


Additional comment: 





PROCEDURE:  CT Abdomen and Pelvis without intravenous contrast





HISTORY:


RIGHT FLANK PAIN





COMPARISON:


None.





TECHNIQUE:


Contiguous images were obtained from the domes of the diaphragms to the upper 

thighs without the administration of intravenous contrast. Oral contrast was 

not administered.





Radiation dose: 





Total exam DLP = 1154.3 mGy-cm.





This CT exam was performed using one or more of the following dose reduction 

techniques: Automated exposure control, adjustment of the mA and/or kV 

according to patient size, and/or use of iterative reconstruction technique.





FINDINGS:





LOWER THORAX:


Unremarkable. 





LIVER:


Unremarkable. No gross lesion or ductal dilatation.  





GALLBLADDER AND BILE DUCTS:


Unremarkable. 





PANCREAS:


Unremarkable. No gross lesion or ductal dilatation.





SPLEEN:


Unremarkable. 





ADRENALS:


Unremarkable. No mass. 





KIDNEYS AND URETERS:


Unremarkable. No hydronephrosis. No solid mass. 





VASCULATURE:


Unremarkable. No aortic aneurysm. 





BOWEL:


Unremarkable. No obstruction. No gross mural thickening. 





APPENDIX:


Unremarkable. Normal appendix. 





PERITONEUM:


Unremarkable. No free fluid. No free air. 





LYMPH NODES:


Unremarkable. No enlarged lymph nodes. 





BLADDER:


Unremarkable. 





REPRODUCTIVE:


Unremarkable. 





BONES:


No acute fracture. 





OTHER FINDINGS:


None.





IMPRESSION:


No acute abdominal pelvic pathology.





There is no evidence of small-bowel obstruction. Small bowel loops are 

nondilated and are normal in caliber. Findings are discordant with the 

preliminary interpretation provided by teleradiology.











Assessment & Plan





- Assessment and Plan (Free Text)


Assessment: 





28 year old female with right flank pain


Plan: 





NPO


IVF


renal us


gallbladder us


pain medication


surgery consult


GI prophylaxis


elevated LFTs, trend labs

## 2018-03-31 NOTE — CT
PROCEDURE:  CT Abdomen and Pelvis without intravenous contrast



HISTORY:

RIGHT FLANK PAIN



COMPARISON:

None.



TECHNIQUE:

Contiguous images were obtained from the domes of the diaphragms to 

the upper thighs without the administration of intravenous contrast. 

Oral contrast was not administered.



Radiation dose: 



Total exam DLP = 1154.3 mGy-cm.



This CT exam was performed using one or more of the following dose 

reduction techniques: Automated exposure control, adjustment of the 

mA and/or kV according to patient size, and/or use of iterative 

reconstruction technique.



FINDINGS:



LOWER THORAX:

Unremarkable. 



LIVER:

Unremarkable. No gross lesion or ductal dilatation.  



GALLBLADDER AND BILE DUCTS:

Unremarkable. 



PANCREAS:

Unremarkable. No gross lesion or ductal dilatation.



SPLEEN:

Unremarkable. 



ADRENALS:

Unremarkable. No mass. 



KIDNEYS AND URETERS:

Unremarkable. No hydronephrosis. No solid mass. 



VASCULATURE:

Unremarkable. No aortic aneurysm. 



BOWEL:

Unremarkable. No obstruction. No gross mural thickening. 



APPENDIX:

Unremarkable. Normal appendix. 



PERITONEUM:

Unremarkable. No free fluid. No free air. 



LYMPH NODES:

Unremarkable. No enlarged lymph nodes. 



BLADDER:

Unremarkable. 



REPRODUCTIVE:

Unremarkable. 



BONES:

No acute fracture. 



OTHER FINDINGS:

None.



IMPRESSION:

No acute abdominal pelvic pathology.



There is no evidence of small-bowel obstruction. Small bowel loops 

are nondilated and are normal in caliber. Findings are discordant 

with the preliminary interpretation provided by teleradiology.

## 2018-03-31 NOTE — US
PROCEDURE:  Ultrasound of the Kidneys



HISTORY:

r flank pain



COMPARISON:

Correlation is made to CT scan of the abdomen and pelvis performed 

approximately 7 hours prior.



TECHNIQUE:

Sonogram of the kidneys.



FINDINGS:



RIGHT KIDNEY:

Measures: 13.4 x 4.6 x 4.8 cm. 



Normal in size, contour and echogenicity. 



No stone, solid mass lesion or hydronephrosis visualized. 



LEFT KIDNEY:

Measures: 11.4 x 4.3 x 4.7 cm. 



Normal in size, contour and echogenicity. 



No stone, solid mass lesion or hydronephrosis visualized. 



OTHER FINDINGS:

None.



IMPRESSION:

Unremarkable renal sonogram.

## 2018-04-01 LAB
ALBUMIN SERPL-MCNC: 3.5 G/DL (ref 3.5–5)
ALBUMIN/GLOB SERPL: 0.9 {RATIO} (ref 1–2.1)
ALT SERPL-CCNC: 159 U/L (ref 9–52)
APTT BLD: 29.7 SECONDS (ref 25.6–37.1)
AST SERPL-CCNC: 177 U/L (ref 14–36)
BUN SERPL-MCNC: 6 MG/DL (ref 7–17)
CALCIUM SERPL-MCNC: 8.9 MG/DL (ref 8.4–10.2)
ERYTHROCYTE [DISTWIDTH] IN BLOOD BY AUTOMATED COUNT: 11.6 % (ref 11.5–14.5)
GFR NON-AFRICAN AMERICAN: > 60
HGB BLD-MCNC: 13.6 G/DL (ref 12–16)
INR PPP: 1.1 (ref 0.9–1.2)
MCH RBC QN AUTO: 30.9 PG (ref 27–31)
MCHC RBC AUTO-ENTMCNC: 33.9 G/DL (ref 33–37)
MCV RBC AUTO: 91.1 FL (ref 81–99)
PLATELET # BLD: 265 K/UL (ref 130–400)
PROTHROMBIN TIME: 12.7 SECONDS (ref 9.8–13.1)
RBC # BLD AUTO: 4.4 MIL/UL (ref 3.8–5.2)
WBC # BLD AUTO: 3.7 K/UL (ref 4.8–10.8)

## 2018-04-01 NOTE — CP.PCM.PN
Subjective





- Date & Time of Evaluation


Date of Evaluation: 04/01/18


Time of Evaluation: 08:06





- Subjective


Subjective: 





SURGERY NOTE FOR DR. MCGRATH





28F seen and examined at bedside. Continue to complain of RUQ pain that she has 

never had before. Denies nausea/vomiting overnight but did have those symptoms 

while in the ER. Tolerating liquid diet. 





Objective





- Vital Signs/Intake and Output


Vital Signs (last 24 hours): 


 











Temp Pulse Resp BP Pulse Ox


 


 98.7 F   81   20   121/83   99 


 


 04/01/18 00:36  04/01/18 00:36  04/01/18 00:36  04/01/18 00:36  04/01/18 00:36











- Medications


Medications: 


 Current Medications





Hydromorphone HCl (Dilaudid)  0.5 mg IVP Q3 PRN


   PRN Reason: Pain, severe (8-10)


Ondansetron HCl (Zofran Odt)  8 mg PO Q8H PRN


   PRN Reason: Nausea/Vomiting


Pantoprazole Sodium (Protonix Inj)  40 mg IVP DAILY [facility]AN


   Last Admin: 03/31/18 08:48 Dose:  40 mg











- Labs


Labs: 


 





 03/31/18 03:00 





 03/31/18 03:00 











- Constitutional


Appears: Well, Non-toxic, No Acute Distress, Other (obese)





- Respiratory Exam


Respiratory Exam: Clear to Ausculation Bilateral, NORMAL BREATHING PATTERN





- Cardiovascular Exam


Cardiovascular Exam: REGULAR RHYTHM, +S1, +S2





- GI/Abdominal Exam


GI & Abdominal Exam: Soft, Tenderness (RUQ tenderness).  absent: Distended, Firm

, Guarding, Rigid, Rebound


Additional comments: 





Positive murphys sign





- Neurological Exam


Neurological Exam: Alert, Awake





- Skin


Skin Exam: Dry, Intact, Normal Color, Warm





Assessment and Plan





- Assessment and Plan (Free Text)


Assessment: 





28F with cholecystitis


Plan: 





pain control


fluids


HIDA


Will need gallbladder removed


Further recs per Dr. Ricco Alvarez, PGY2

## 2018-04-01 NOTE — CP.PCM.PN
Subjective





- Date & Time of Evaluation


Date of Evaluation: 04/01/18


Time of Evaluation: 09:35





- Subjective


Subjective: 





Still complains of RUQ pain, states she has never had this kind of pain before


Denies nausea, vomiting, fever or chills





Objective





- Vital Signs/Intake and Output


Vital Signs (last 24 hours): 


 











Temp Pulse Resp BP Pulse Ox


 


 98.2 F   58 L  18   117/78   100 


 


 04/01/18 16:10  04/01/18 16:10  04/01/18 16:10  04/01/18 16:10  04/01/18 16:10











- Medications


Medications: 


 Current Medications





Hydromorphone HCl (Dilaudid)  0.5 mg IVP Q3 PRN


   PRN Reason: Pain, severe (8-10)


Sodium Chloride (Sodium Chloride 0.9%)  1,000 mls @ 175 mls/hr IV .Q5H43M [facility]


   Stop: 04/02/18 09:37


Ondansetron HCl (Zofran Odt)  8 mg PO Q8H PRN


   PRN Reason: Nausea/Vomiting


Pantoprazole Sodium (Protonix Inj)  40 mg IVP DAILY [facility]


   Last Admin: 04/01/18 08:58 Dose:  40 mg











- Labs


Labs: 


 





 04/01/18 08:29 





 04/01/18 08:29 





 











PT  12.7 Seconds (9.8-13.1)   04/01/18  08:29    


 


INR  1.1  (0.9-1.2)   04/01/18  08:29    


 


APTT  29.7 Seconds (25.6-37.1)   04/01/18  08:29    














- Constitutional


Appears: Well, No Acute Distress





- Head Exam


Head Exam: ATRAUMATIC, NORMOCEPHALIC





- Respiratory Exam


Respiratory Exam: Clear to Ausculation Bilateral, NORMAL BREATHING PATTERN





- Cardiovascular Exam


Cardiovascular Exam: REGULAR RHYTHM, +S1, +S2





- GI/Abdominal Exam


GI & Abdominal Exam: Soft, Normal Bowel Sounds





- Neurological Exam


Neurological Exam: Alert, Oriented x3





- Skin


Skin Exam: Normal Color, Warm





Assessment and Plan





- Assessment and Plan (Free Text)


Assessment: 





28 year old with abdominal pain





Plan: 





Cholelithiasis on gallbladder us


NPO


IVF


surgery on board possible lap daniel


trend labs

## 2018-04-02 LAB
ALBUMIN SERPL-MCNC: 4.2 G/DL (ref 3.5–5)
ALBUMIN/GLOB SERPL: 1 {RATIO} (ref 1–2.1)
ALT SERPL-CCNC: 246 U/L (ref 9–52)
AST SERPL-CCNC: 267 U/L (ref 14–36)
BASOPHILS # BLD AUTO: 0 K/UL (ref 0–0.2)
BASOPHILS NFR BLD: 0.7 % (ref 0–2)
BUN SERPL-MCNC: 9 MG/DL (ref 7–17)
CALCIUM SERPL-MCNC: 9.6 MG/DL (ref 8.4–10.2)
EOSINOPHIL # BLD AUTO: 0 K/UL (ref 0–0.7)
EOSINOPHIL NFR BLD: 0.8 % (ref 0–4)
ERYTHROCYTE [DISTWIDTH] IN BLOOD BY AUTOMATED COUNT: 11.4 % (ref 11.5–14.5)
GFR NON-AFRICAN AMERICAN: > 60
HGB BLD-MCNC: 13.4 G/DL (ref 12–16)
LYMPHOCYTES # BLD AUTO: 1.2 K/UL (ref 1–4.3)
LYMPHOCYTES NFR BLD AUTO: 22.6 % (ref 20–40)
MCH RBC QN AUTO: 31 PG (ref 27–31)
MCHC RBC AUTO-ENTMCNC: 34.5 G/DL (ref 33–37)
MCV RBC AUTO: 89.7 FL (ref 81–99)
MONOCYTES # BLD: 0.4 K/UL (ref 0–0.8)
MONOCYTES NFR BLD: 8.2 % (ref 0–10)
NEUTROPHILS # BLD: 3.5 K/UL (ref 1.8–7)
NEUTROPHILS NFR BLD AUTO: 67.7 % (ref 50–75)
NRBC BLD AUTO-RTO: 0.2 % (ref 0–0)
PLATELET # BLD: 278 K/UL (ref 130–400)
PMV BLD AUTO: 8.3 FL (ref 7.2–11.7)
RBC # BLD AUTO: 4.34 MIL/UL (ref 3.8–5.2)
WBC # BLD AUTO: 5.2 K/UL (ref 4.8–10.8)

## 2018-04-02 PROCEDURE — 0FT44ZZ RESECTION OF GALLBLADDER, PERCUTANEOUS ENDOSCOPIC APPROACH: ICD-10-PCS

## 2018-04-02 PROCEDURE — BF03YZZ PLAIN RADIOGRAPHY OF GALLBLADDER AND BILE DUCTS USING OTHER CONTRAST: ICD-10-PCS

## 2018-04-02 NOTE — PN
DATE:  03/31/2018



HISTORY OF PRESENT ILLNESS:  Patient is seen on the floor in [address] at

[place].  She is admitted with abdominal pain.  Vital signs are normal with

temperature of 98, pulse of 65, blood pressure normal.  Her white count is

normal.  Hemoglobin is normal.  SMA-18 showed glucose slightly elevated. 

Urine otherwise unremarkable, screen is unremarkable.  There is a CAT scan

reported by Dr. Mcdaniel, no evidence of small bowel obstruction.  There is

some dilated loops of bowel, but it is unremarkable.  _____ from the

initial report, review of the films myself and I believe there is small

bowel obstruction.  Kidneys are unremarkable.  There is a gallbladder

ultrasound, I also reviewed, showing cholelithiasis without bladder

thickening.  There was some tenderness.  The history is remarkable that she

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 

She is a 28-year-old woman who was admitted to the emergency room with a

history of psoriasis, lipoma, and a pilonidal abscess.  There is no

vomiting.  Does have back pain and flank pain.  I do not see that history

in the _____ report.  In any case, admitted mostly for right flank pain

without associated symptoms of dysuria, frequency, urgency, nocturia, or

hematuria.



PHYSICAL EXAMINATION:

GENERAL:  She is obese, tender.  She has a difficult time telling her

medical story.

ABDOMEN:  Soft, nontender.  No guarding or rebound.



ASSESSMENT AND PLAN:  She states she has passed gas, and from my point of

view, I have no problem feeding her.







__________________________________________

Gabino Chacko MD



DD:  03/31/2018 20:19:24DT:  04/01/2018 2:58:27

Job # [id number]

## 2018-04-02 NOTE — PCM.SURG1
Surgeon's Initial Post Op Note





- Surgeon's Notes


Surgeon: Dr. Singh


Assistant: Leon PGY3, David PGY2, Keith PGY1


Type of Anesthesia: General Endo


Anesthesia Administered By: Dr. Bill


Pre-Operative Diagnosis: Cholelithiasis, biliary colic


Operative Findings: see operative report


Post-Operative Diagnosis: Cholelithiasis, biliary colic, cholecystitis


Operation Performed: Laparoscopic Cholecystectomy


Specimen/Specimens Removed: Gallbladder with gallstones


Estimated Blood Loss: EBL {In ML}: 15


Blood Products Given: N/A


Drains Used: No Drains


Post-Op Condition: Good


Date of Surgery/Procedure: 04/02/18


Time of Surgery/Procedure: 12:00

## 2018-04-03 LAB
ALBUMIN SERPL-MCNC: 3.6 G/DL (ref 3.5–5)
ALBUMIN/GLOB SERPL: 0.9 {RATIO} (ref 1–2.1)
ALT SERPL-CCNC: 292 U/L (ref 9–52)
AST SERPL-CCNC: 256 U/L (ref 14–36)
BUN SERPL-MCNC: 7 MG/DL (ref 7–17)
CALCIUM SERPL-MCNC: 9.4 MG/DL (ref 8.4–10.2)
GFR NON-AFRICAN AMERICAN: > 60
HEPATITIS A IGM: NEGATIVE
HEPATITIS B CORE AB: NEGATIVE
HEPATITIS C ANTIBODY: NEGATIVE

## 2018-04-03 NOTE — CP.PCM.PN
Subjective





- Date & Time of Evaluation


Date of Evaluation: 04/03/18


Time of Evaluation: 06:30





- Subjective


Subjective: 





General Surgery Note for Dr. Chacko





Patient seen and examined at bedside. No acute event overnight. Patient is s/p 

laparoscopic cholecystectomy POD#1. Patient states abdominal pain is 

controlled. Denies nausea/vomiting. She is tolerating diet. Patient has no 

complaint this morning.





Objective





- Vital Signs/Intake and Output


Vital Signs (last 24 hours): 


 











Temp Pulse Resp BP Pulse Ox


 


 98.4 F   86   18   127/84   99 


 


 04/03/18 03:43  04/03/18 03:43  04/03/18 03:43  04/03/18 03:43  04/03/18 03:43











- Medications


Medications: 


 Current Medications





Hydromorphone HCl (Dilaudid)  0.5 mg IVP Q3 PRN


   PRN Reason: Pain, severe (8-10)


Lactated Ringer's (Lactated Ringer's)  1,000 mls @ 125 mls/hr IV .Q8H [facility]AN


Lactated Ringer's (Lactated Ringer's)  1,000 mls @ 100 mls/hr IV .Q10H [facility]


Ondansetron HCl (Zofran Odt)  8 mg PO Q8H PRN


   PRN Reason: Nausea/Vomiting


Oxycodone/Acetaminophen (Percocet 5/325 Mg Tab)  1 tab PO Q4 PRN


   PRN Reason: Pain, moderate (4-7)


   Stop: 04/05/18 13:58


Pantoprazole Sodium (Protonix Inj)  40 mg IVP DAILY [facility]AN


   Last Admin: 04/02/18 10:09 Dose:  40 mg











- Labs


Labs: 


 





 04/02/18 08:00 





 04/02/18 08:00 





 











PT  12.7 Seconds (9.8-13.1)   04/01/18  08:29    


 


INR  1.1  (0.9-1.2)   04/01/18  08:29    


 


APTT  29.7 Seconds (25.6-37.1)   04/01/18  08:29    














- Constitutional


Appears: No Acute Distress





- Head Exam


Head Exam: ATRAUMATIC, NORMOCEPHALIC





- Eye Exam


Eye Exam: Normal appearance





- ENT Exam


ENT Exam: Mucous Membranes Moist





- Respiratory Exam


Respiratory Exam: NORMAL BREATHING PATTERN





- Cardiovascular Exam


Cardiovascular Exam: REGULAR RHYTHM





- GI/Abdominal Exam


GI & Abdominal Exam: Soft.  absent: Distended, Firm, Guarding, Tenderness


Additional comments: 





incisions clean dry and intact





- Extremities Exam


Extremities Exam: Normal Capillary Refill





- Neurological Exam


Neurological Exam: Alert, Awake, Oriented x3





- Psychiatric Exam


Psychiatric exam: Normal Affect, Normal Mood





- Skin


Skin Exam: Dry, Intact, Normal Color, Warm





Assessment and Plan





- Assessment and Plan (Free Text)


Plan: 





28 F s/p Laparoscopic Cholecystectomy POD#1





-Regular diet


-f/u AM labs


-Patient may be discharged pending labs 


-f/u with Dr. Chacko within 1-2 weeks after discharge


-Discussed with Dr. Ricco Parker PGY1

## 2018-04-03 NOTE — CP.PCM.PN
Subjective





- Date & Time of Evaluation


Date of Evaluation: 04/03/18


Time of Evaluation: 18:00





- Subjective


Subjective: 





Abdominal pain is better, pain medication helping.


Tolerating diet. Denies nausea, vomiting, fever or chills





Objective





- Vital Signs/Intake and Output


Vital Signs (last 24 hours): 


 











Temp Pulse Resp BP Pulse Ox


 


 98 F   72   18   138/91 H  100 


 


 04/03/18 17:00  04/03/18 17:00  04/03/18 17:00  04/03/18 17:00  04/03/18 17:00











- Medications


Medications: 


 Current Medications





Hydromorphone HCl (Dilaudid)  0.5 mg IVP Q3 PRN


   PRN Reason: Pain, severe (8-10)


Lactated Ringer's (Lactated Ringer's)  1,000 mls @ 100 mls/hr IV .Q10H [facility]


   Last Admin: 04/03/18 12:49 Dose:  Not Given


Lactated Ringer's (Lactated Ringer's)  1,000 mls @ 150 mls/hr IV .Q6H40M [facility]


   Last Admin: 04/03/18 12:49 Dose:  150 mls/hr


Ondansetron HCl (Zofran Odt)  8 mg PO Q8H PRN


   PRN Reason: Nausea/Vomiting


Oxycodone/Acetaminophen (Percocet 5/325 Mg Tab)  1 tab PO Q4 PRN


   PRN Reason: Pain, moderate (4-7)


   Stop: 04/05/18 13:58


Pantoprazole Sodium (Protonix Inj)  40 mg IVP DAILY [facility]


   Last Admin: 04/03/18 08:27 Dose:  40 mg











- Labs


Labs: 


 





 04/02/18 08:00 





 04/03/18 09:05 





 











PT  12.7 Seconds (9.8-13.1)   04/01/18  08:29    


 


INR  1.1  (0.9-1.2)   04/01/18  08:29    


 


APTT  29.7 Seconds (25.6-37.1)   04/01/18  08:29    














- Constitutional


Appears: Well, No Acute Distress





- Head Exam


Head Exam: ATRAUMATIC





- Respiratory Exam


Respiratory Exam: Clear to Ausculation Bilateral, NORMAL BREATHING PATTERN





- Cardiovascular Exam


Cardiovascular Exam: REGULAR RHYTHM, +S1, +S2





- GI/Abdominal Exam


GI & Abdominal Exam: Soft, Tenderness (slight), Normal Bowel Sounds





- Neurological Exam


Neurological Exam: Alert, Oriented x3





- Psychiatric Exam


Psychiatric exam: Normal Affect, Normal Mood





- Skin


Skin Exam: Normal Color, Warm





Assessment and Plan


(1) Acute cholecystitis


Assessment & Plan: 


R/O Choledocholithiasis





Status: Acute   





- Assessment and Plan (Free Text)


Assessment: 





28 year old s/p lap daniel, POD #1


Plan: 





Elevation in LFTs


GI consulted and Going for ERCP tomorrow


NPO past Midnight


Sx Following the patient


Encourage oob to ambulate


Continue rest of treatment

## 2018-04-03 NOTE — CP.PCM.CON
History of Present Illness





- History of Present Illness


History of Present Illness: 





This is a 28 yr old obese F  with PMH of JOSEPHINE, psoriaisis, depression/

anxiety presents to [facility] for complaint of RUQ pain s/p laprascopic 

cholecystitis. POD 1 bilirubin and LFT uptrending. No fever, chills, nausea, 

vomiting, diarrhea, constipation. No past history of alcohol or IVDA or 

hepatitis. 





Review of Systems





- Review of Systems


Review of Systems: 





12 point ROS unremarkable except that documented in HPI





Past Patient History





- Past Medical History & Family History


Past Medical History?: Yes





- Past Social History


Alcohol: Occasional


Drugs: Denies





- CARDIAC


Hx Hypertension: No





- PULMONARY


Hx Sleep Apnea: Yes





- NEUROLOGICAL


Hx Neurological Disorder: No





- HEENT


Other/Comment: Use Eyeglasses for reading





- RENAL


Hx Chronic Kidney Disease: No





- ENDOCRINE/METABOLIC


Hx Endocrine Disorders: Yes (thyroid issue, no meds)





- HEMATOLOGICAL/ONCOLOGICAL


Hx Blood Disorders: No





- INTEGUMENTARY


Hx Psoriasis: Yes





- MUSCULOSKELETAL/RHEUMATOLOGICAL


Hx Musculoskeletal Disorders: No


Hx Falls: No





- GASTROINTESTINAL


Hx Gastrointestinal Disorders: No





- GENITOURINARY/GYNECOLOGICAL


Hx Genitourinary Disorders: No





- PSYCHIATRIC


Hx Depression: No





- SURGICAL HISTORY


Hx Surgeries: Yes (lipoma removal from head)


Other/Comment: Removal of cyst on the tailbone X2





- ANESTHESIA


Hx Anesthesia: Yes





Meds


Allergies/Adverse Reactions: 


 Allergies











Allergy/AdvReac Type Severity Reaction Status Date / Time


 


No Known Allergies Allergy   Verified 17 07:25














- Medications


Medications: 


 Current Medications





Hydromorphone HCl (Dilaudid)  0.5 mg IVP Q3 PRN


   PRN Reason: Pain, severe (8-10)


Lactated Ringer's (Lactated Ringer's)  1,000 mls @ 100 mls/hr IV .Q10H [facility]


Lactated Ringer's (Lactated Ringer's)  1,000 mls @ 150 mls/hr IV .Q6H40M [facility]


Ondansetron HCl (Zofran Odt)  8 mg PO Q8H PRN


   PRN Reason: Nausea/Vomiting


Oxycodone/Acetaminophen (Percocet 5/325 Mg Tab)  1 tab PO Q4 PRN


   PRN Reason: Pain, moderate (4-7)


   Stop: 18 13:58


Pantoprazole Sodium (Protonix Inj)  40 mg IVP DAILY [facility]


   Last Admin: 18 08:27 Dose:  40 mg











Physical Exam





- Constitutional


Appears: Well





- Head Exam


Head Exam: ATRAUMATIC, NORMAL INSPECTION, NORMOCEPHALIC





- Eye Exam


Eye Exam: EOMI, Normal appearance, PERRL





- ENT Exam


ENT Exam: Mucous Membranes Moist, Normal Exam





- Respiratory Exam


Respiratory Exam: Clear to Auscultation Bilateral, NORMAL BREATHING PATTERN





- Cardiovascular Exam


Cardiovascular Exam: REGULAR RHYTHM, RRR, +S1, +S2





- GI/Abdominal Exam


GI & Abdominal Exam: Normal Bowel Sounds, Soft.  absent: Tenderness





- Neurological Exam


Neurological exam: Alert, CN II-XII Intact, Normal Gait, Oriented x3, Reflexes 

Normal





- Psychiatric Exam


Psychiatric exam: Normal Affect, Normal Mood





- Skin


Skin Exam: Dry, Intact, Normal Color, Warm





Results





- Vital Signs


Recent Vital Signs: 


 Last Vital Signs











Temp  98.4 F   18 08:17


 


Pulse  71   18 08:17


 


Resp  20   18 08:17


 


BP  135/89   18 08:17


 


Pulse Ox  100   18 08:17














- Labs


Result Diagrams: 


 18 08:00





 18 09:05


Labs: 


 Laboratory Results - last 24 hr











  18





  09:05


 


Sodium  144


 


Potassium  4.6


 


Chloride  102


 


Carbon Dioxide  29


 


Anion Gap  18


 


BUN  7


 


Creatinine  0.9


 


Est GFR ( Amer)  > 60


 


Est GFR (Non-Af Amer)  > 60


 


Random Glucose  113 H


 


Calcium  9.4


 


Total Bilirubin  5.2 H


 


AST  256 H


 


ALT  292 H


 


Alkaline Phosphatase  240 H


 


Total Protein  7.7


 


Albumin  3.6


 


Globulin  4.1 H


 


Albumin/Globulin Ratio  0.9 L














Assessment & Plan





- Assessment and Plan (Free Text)


Assessment: 





28 yr old obese F with RUQ pain and acute cholecystitis s/p laprascopic 

cholecystectomy with uptrending LFT. Plan for EUS with ERCP tomorrow


Plan: 





- NPO past midnight


- Trend daily LFT


- EUS with ERCP in am for potential choledocholithiasis


- Discussed with the team

## 2018-04-04 ENCOUNTER — HOSPITAL ENCOUNTER (OUTPATIENT)
Dept: HOSPITAL 31 - C.ENDO | Age: 28
Discharge: HOME | End: 2018-04-04
Attending: INTERNAL MEDICINE
Payer: COMMERCIAL

## 2018-04-04 VITALS — OXYGEN SATURATION: 100 %

## 2018-04-04 VITALS — HEART RATE: 88 BPM

## 2018-04-04 VITALS — BODY MASS INDEX: 43.8 KG/M2

## 2018-04-04 VITALS — SYSTOLIC BLOOD PRESSURE: 143 MMHG | RESPIRATION RATE: 19 BRPM | HEART RATE: 88 BPM | DIASTOLIC BLOOD PRESSURE: 78 MMHG

## 2018-04-04 VITALS — TEMPERATURE: 97.8 F

## 2018-04-04 DIAGNOSIS — E66.9: ICD-10-CM

## 2018-04-04 DIAGNOSIS — K80.50: Primary | ICD-10-CM

## 2018-04-04 DIAGNOSIS — Z90.49: ICD-10-CM

## 2018-04-04 LAB
ALBUMIN SERPL-MCNC: 3.6 G/DL (ref 3.5–5)
ALBUMIN/GLOB SERPL: 0.8 {RATIO} (ref 1–2.1)
ALT SERPL-CCNC: 286 U/L (ref 9–52)
AST SERPL-CCNC: 226 U/L (ref 14–36)
BUN SERPL-MCNC: 6 MG/DL (ref 7–17)
CALCIUM SERPL-MCNC: 9.2 MG/DL (ref 8.4–10.2)
GFR NON-AFRICAN AMERICAN: > 60

## 2018-04-04 PROCEDURE — 0FC98ZZ EXTIRPATION OF MATTER FROM COMMON BILE DUCT, VIA NATURAL OR ARTIFICIAL OPENING ENDOSCOPIC: ICD-10-PCS

## 2018-04-04 PROCEDURE — 43277 ERCP EA DUCT/AMPULLA DILATE: CPT

## 2018-04-04 PROCEDURE — 43264 ERCP REMOVE DUCT CALCULI: CPT

## 2018-04-04 PROCEDURE — 84703 CHORIONIC GONADOTROPIN ASSAY: CPT

## 2018-04-04 NOTE — CP.PCM.PN
Subjective





- Date & Time of Evaluation


Date of Evaluation: 04/04/18


Time of Evaluation: 12:21





- Subjective


Subjective: 





SURGERY NOTE FOR DR. MCGRATH





28F currently at [facility] for EUS/ ERCP. 





Objective





- Vital Signs/Intake and Output


Vital Signs (last 24 hours): 


 











Temp Pulse Resp BP Pulse Ox


 


 98.1 F   73   20   133/85   99 


 


 04/04/18 09:00  04/04/18 09:00  04/04/18 09:00  04/04/18 09:00  04/04/18 09:00











- Medications


Medications: 


 Current Medications





Hydromorphone HCl (Dilaudid)  0.5 mg IVP Q3 PRN


   PRN Reason: Pain, severe (8-10)


Lactated Ringer's (Lactated Ringer's)  1,000 mls @ 100 mls/hr IV .Q10H [facility]


   Last Admin: 04/04/18 06:31 Dose:  Not Given


Lactated Ringer's (Lactated Ringer's)  1,000 mls @ 150 mls/hr IV .Q6H40M [facility]


   Last Admin: 04/04/18 03:12 Dose:  150 mls/hr


Ondansetron HCl (Zofran Odt)  8 mg PO Q8H PRN


   PRN Reason: Nausea/Vomiting


Oxycodone/Acetaminophen (Percocet 5/325 Mg Tab)  1 tab PO Q4 PRN


   PRN Reason: Pain, moderate (4-7)


   Stop: 04/05/18 13:58


Pantoprazole Sodium (Protonix Inj)  40 mg IVP DAILY [facility]


   Last Admin: 04/04/18 09:07 Dose:  40 mg











- Labs


Labs: 


 





 04/02/18 08:00 





 04/04/18 05:30 





 











PT  12.7 Seconds (9.8-13.1)   04/01/18  08:29    


 


INR  1.1  (0.9-1.2)   04/01/18  08:29    


 


APTT  29.7 Seconds (25.6-37.1)   04/01/18  08:29    














Assessment and Plan





- Assessment and Plan (Free Text)


Assessment: 





28 S/P Laparoscopic cholecystectomy  POD#2


 with elevated in T/bili post op.


Plan: 





- EUS/ERCP


Further recs discuss with Dr. Ricco Alvarez, PGY2

## 2018-04-04 NOTE — CP.SDSHP
Same Day Surgery H & P





- History


Proposed Procedure: eus ercp


Pre-Op Diagnosis: suspected choledocholithiasis





- Previous Medical/Surgical History


Endocrine/Metabolic: Obesity


Previous Surgical History: cholecystectomy





- Allergies


Allergies: 


Allergies





No Known Allergies Allergy (Verified 04/04/18 11:54)


 











- Physical Exam


General Appearance: nl


Vital Signs: 


 Vital Signs











  04/04/18 04/04/18





  12:12 12:59


 


Temperature 97.5 F L 


 


Pulse Rate 75 78


 


Respiratory 19 18





Rate  


 


Blood Pressure 130/79 131/82


 


O2 Sat by Pulse 99 100





Oximetry  











Mental Status: Alert & Oriented x3


Neuro: WNL


Heart: WNL


Lungs: WNL


GI: WNL





- {Optional Preform as Required}


Abdomen: WNL





- Impression


Impression: suspected choledocholithiasis


Pt. Evaluated Today:Candidate for Anesthesia & Procedure: Yes





- Date & Time


Date: 04/04/18


Time: 13:51





Short Stay Discharge





- Short Stay Discharge


Admitting Diagnosis/Reason for Visit: ABN LEVELS OF OTHER SERUM ENZYMES/ 

PERFORATION OF


Disposition: HOME/ ROUTINE

## 2018-04-04 NOTE — CP.PCM.PN
Subjective





- Date & Time of Evaluation


Date of Evaluation: 04/04/18


Time of Evaluation: 19:15





- Subjective


Subjective: 





Feeling a little tired. Had ERCP today.


Tolerated diet. Denies N/V





Objective





- Vital Signs/Intake and Output


Vital Signs (last 24 hours): 


 











Temp Pulse Resp BP Pulse Ox


 


 97.7 F   71   18   145/101 H  98 


 


 04/04/18 16:37  04/04/18 16:37  04/04/18 16:37  04/04/18 16:37  04/04/18 16:37











- Medications


Medications: 


 Current Medications





Hydromorphone HCl (Dilaudid)  0.5 mg IVP Q3 PRN


   PRN Reason: Pain, severe (8-10)


Lactated Ringer's (Lactated Ringer's)  1,000 mls @ 100 mls/hr IV .Q10H [facility]


   Last Admin: 04/04/18 06:31 Dose:  Not Given


Lactated Ringer's (Lactated Ringer's)  1,000 mls @ 150 mls/hr IV .Q6H40M [facility]


   Last Admin: 04/04/18 03:12 Dose:  150 mls/hr


Lactated Ringer's (Lactated Ringer's 500ml)  500 mls @ 75 mls/hr IV .Q6H40M [facility]


Lactated Ringer's (Lactated Ringer's 500ml)  500 mls @ 75 mls/hr IV .Q6H40M [facility]


Ondansetron HCl (Zofran Odt)  8 mg PO Q8H PRN


   PRN Reason: Nausea/Vomiting


Oxycodone/Acetaminophen (Percocet 5/325 Mg Tab)  1 tab PO Q4 PRN


   PRN Reason: Pain, moderate (4-7)


   Stop: 04/05/18 13:58


Pantoprazole Sodium (Protonix Inj)  40 mg IVP DAILY [facility]


   Last Admin: 04/04/18 09:07 Dose:  40 mg











- Labs


Labs: 


 





 04/02/18 08:00 





 04/04/18 05:30 





 











PT  12.7 Seconds (9.8-13.1)   04/01/18  08:29    


 


INR  1.1  (0.9-1.2)   04/01/18  08:29    


 


APTT  29.7 Seconds (25.6-37.1)   04/01/18  08:29    














- Constitutional


Appears: Well, No Acute Distress





- Head Exam


Head Exam: ATRAUMATIC, NORMOCEPHALIC





- Respiratory Exam


Respiratory Exam: Clear to Ausculation Bilateral, NORMAL BREATHING PATTERN





- Cardiovascular Exam


Cardiovascular Exam: REGULAR RHYTHM, +S1, +S2





- GI/Abdominal Exam


GI & Abdominal Exam: Soft, Normal Bowel Sounds





- Neurological Exam


Neurological Exam: Alert, Oriented x3





- Psychiatric Exam


Psychiatric exam: Normal Affect, Normal Mood





- Skin


Skin Exam: Normal Color, Warm





Assessment and Plan


(1) Acute cholecystitis


Assessment & Plan: 


S/p lap daniel POD # 2


Had ERCP today at [facility], CBD stone extracted


Trend labs


Tolerating diet


If continues to improve, will d/c in am


Status: Acute

## 2018-04-05 VITALS — TEMPERATURE: 97.6 F | DIASTOLIC BLOOD PRESSURE: 79 MMHG | RESPIRATION RATE: 20 BRPM | SYSTOLIC BLOOD PRESSURE: 137 MMHG

## 2018-04-05 LAB
ALBUMIN SERPL-MCNC: 3.5 G/DL (ref 3.5–5)
ALBUMIN/GLOB SERPL: 0.8 {RATIO} (ref 1–2.1)
ALT SERPL-CCNC: 253 U/L (ref 9–52)
AST SERPL-CCNC: 134 U/L (ref 14–36)
BUN SERPL-MCNC: 8 MG/DL (ref 7–17)
CALCIUM SERPL-MCNC: 9.2 MG/DL (ref 8.4–10.2)
GFR NON-AFRICAN AMERICAN: > 60

## 2018-04-05 NOTE — CP.PCM.PN
Subjective





- Date & Time of Evaluation


Date of Evaluation: 04/05/18


Time of Evaluation: 07:50





- Subjective


Subjective: 





General Surgery Progress Note- Dr. Chacko





28 y.o female seen and evaluated at bedside for s/p POD#3 Laparoscopic 

Cholecystectomy. Patient is seen resting comfortably in bed, in NAD, and AA0x3. 

Patient denies acute overnight events. Patient reports that she is doing well. 

Tolerating regular diet well. No problems with bowel movements or urinary 

problems. No new complaints. No complaints of pain. Denies nausea, fever, 

shortness of breath, chest pain, or chills








Objective





- Vital Signs/Intake and Output


Vital Signs (last 24 hours): 


 











Temp Pulse Resp BP Pulse Ox


 


 98.6 F   88   18   111/74   98 


 


 04/04/18 23:56  04/04/18 23:56  04/04/18 23:56  04/04/18 23:56  04/04/18 23:56











- Medications


Medications: 


 Current Medications





Hydromorphone HCl (Dilaudid)  0.5 mg IVP Q3 PRN


   PRN Reason: Pain, severe (8-10)


Lactated Ringer's (Lactated Ringer's)  1,000 mls @ 100 mls/hr IV .Q10H [facility]


   Last Admin: 04/04/18 06:31 Dose:  Not Given


Lactated Ringer's (Lactated Ringer's)  1,000 mls @ 150 mls/hr IV .Q6H40M [facility]


   Last Admin: 04/04/18 03:12 Dose:  150 mls/hr


Lactated Ringer's (Lactated Ringer's 500ml)  500 mls @ 75 mls/hr IV .Q6H40M [facility]


Lactated Ringer's (Lactated Ringer's 500ml)  500 mls @ 75 mls/hr IV .Q6H40M [facility]


Ondansetron HCl (Zofran Odt)  8 mg PO Q8H PRN


   PRN Reason: Nausea/Vomiting


Oxycodone/Acetaminophen (Percocet 5/325 Mg Tab)  1 tab PO Q4 PRN


   PRN Reason: Pain, moderate (4-7)


   Stop: 04/05/18 13:58


Pantoprazole Sodium (Protonix Inj)  40 mg IVP DAILY [facility]


   Last Admin: 04/04/18 09:07 Dose:  40 mg











- Labs


Labs: 


 





 04/02/18 08:00 





 04/05/18 05:30 





 











PT  12.7 Seconds (9.8-13.1)   04/01/18  08:29    


 


INR  1.1  (0.9-1.2)   04/01/18  08:29    


 


APTT  29.7 Seconds (25.6-37.1)   04/01/18  08:29    














- Constitutional


Appears: Well, Non-toxic, No Acute Distress





- Neck Exam


Neck Exam: Normal Inspection





- Respiratory Exam


Respiratory Exam: NORMAL BREATHING PATTERN





- GI/Abdominal Exam


Additional comments: 





 Soft.  absent: Distended, Firm, Guarding, Tenderness





Incisions clean dry and intact, no clinical signs of infection








Assessment and Plan





- Assessment and Plan (Free Text)


Assessment: 








28 F s/p Laparoscopic Cholecystectomy POD#3





Plan: 





-Regular diet


-Patient stable per general surgery standpoint


-F/U with Dr. Chacko within 1-2 weeks after discharge


-Discussed with Dr. Chacko

## 2018-04-05 NOTE — CP.PCM.PN
Subjective





- Date & Time of Evaluation


Date of Evaluation: 04/05/18


Time of Evaluation: 12:00





- Subjective


Subjective: 





Patient seen at bedside. Doing well post CCY and ERCP. No complains today





Objective





- Vital Signs/Intake and Output


Vital Signs (last 24 hours): 


 











Temp Pulse Resp BP Pulse Ox


 


 97.6 F   88   20   137/79   100 


 


 04/05/18 09:07  04/05/18 09:07  04/05/18 09:07  04/05/18 09:07  04/05/18 09:07











- Medications


Medications: 


 Current Medications





Hydromorphone HCl (Dilaudid)  0.5 mg IVP Q3 PRN


   PRN Reason: Pain, severe (8-10)


Lactated Ringer's (Lactated Ringer's)  1,000 mls @ 100 mls/hr IV .Q10H [facility]


   Last Admin: 04/05/18 11:33 Dose:  Not Given


Lactated Ringer's (Lactated Ringer's)  1,000 mls @ 150 mls/hr IV .Q6H40M [facility]


   Last Admin: 04/05/18 11:33 Dose:  Not Given


Lactated Ringer's (Lactated Ringer's 500ml)  500 mls @ 75 mls/hr IV .Q6H40M [facility]


   Last Admin: 04/05/18 11:34 Dose:  Not Given


Lactated Ringer's (Lactated Ringer's 500ml)  500 mls @ 75 mls/hr IV .Q6H40M [facility]


Ondansetron HCl (Zofran Odt)  8 mg PO Q8H PRN


   PRN Reason: Nausea/Vomiting


Oxycodone/Acetaminophen (Percocet 5/325 Mg Tab)  1 tab PO Q4 PRN


   PRN Reason: Pain, moderate (4-7)


   Stop: 04/05/18 13:58


Pantoprazole Sodium (Protonix Inj)  40 mg IVP DAILY [facility]


   Last Admin: 04/04/18 09:07 Dose:  40 mg











- Labs


Labs: 


 





 04/02/18 08:00 





 04/05/18 05:30 





 











PT  12.7 Seconds (9.8-13.1)   04/01/18  08:29    


 


INR  1.1  (0.9-1.2)   04/01/18  08:29    


 


APTT  29.7 Seconds (25.6-37.1)   04/01/18  08:29    














- Constitutional


Appears: Well, Non-toxic, No Acute Distress





- Head Exam


Head Exam: ATRAUMATIC, NORMAL INSPECTION, NORMOCEPHALIC





- Eye Exam


Eye Exam: EOMI, Normal appearance, PERRL





- ENT Exam


ENT Exam: Mucous Membranes Moist, Normal Exam





- Neck Exam


Neck Exam: Full ROM, Normal Inspection.  absent: Lymphadenopathy





- Respiratory Exam


Respiratory Exam: Clear to Ausculation Bilateral, NORMAL BREATHING PATTERN





- Cardiovascular Exam


Cardiovascular Exam: REGULAR RHYTHM, RRR, Rubs, +S1, +S2





- GI/Abdominal Exam


GI & Abdominal Exam: Distended, Soft, Normal Bowel Sounds.  absent: Tenderness





- Neurological Exam


Neurological Exam: Alert, Awake, Oriented x3





- Psychiatric Exam


Psychiatric exam: Normal Affect, Normal Mood





- Skin


Skin Exam: Dry, Intact, Normal Color, Warm





Assessment and Plan





- Assessment and Plan (Free Text)


Assessment: 





28 yr old F s/p CCY for cholecystitis s/p ERCP with sphincterotomy and stone 

extraction with downtrending LFT. No complains. Doing well


Plan: 





- Advance diet as tolerated


- Follow up with the surgeon


- No other GI intervention required


- No s/s of pancreatitis


- Will sign off now


- Thank you for letting us participate in the care of this patient

## 2018-04-05 NOTE — CP.PCM.DIS
Provider





- Provider


Date of Admission: 


04/02/18 12:30





Attending physician: 


Ibeth Wharton MD





Time Spent in preparation of Discharge (in minutes): 35





Diagnosis





- Discharge Diagnosis


(1) Acute cholecystitis


Status: Acute   





Hospital Course





- Lab Results


Lab Results: 


 Micro Results





03/31/18 11:00   Urine,Clean Catch   Urine Culture - Final


                            Corynebacterium Species





 Most Recent Lab Values











WBC  5.2 K/uL (4.8-10.8)   04/02/18  08:00    


 


RBC  4.34 Mil/uL (3.80-5.20)   04/02/18  08:00    


 


Hgb  13.4 g/dL (12.0-16.0)   04/02/18  08:00    


 


Hct  38.9 % (34.0-47.0)   04/02/18  08:00    


 


MCV  89.7 fl (81.0-99.0)   04/02/18  08:00    


 


MCH  31.0 pg (27.0-31.0)   04/02/18  08:00    


 


MCHC  34.5 g/dL (33.0-37.0)   04/02/18  08:00    


 


RDW  11.4 % (11.5-14.5)  L  04/02/18  08:00    


 


Plt Count  278 K/uL (130-400)   04/02/18  08:00    


 


MPV  8.3 fl (7.2-11.7)   04/02/18  08:00    


 


Neut % (Auto)  67.7 % (50.0-75.0)   04/02/18  08:00    


 


Lymph % (Auto)  22.6 % (20.0-40.0)   04/02/18  08:00    


 


Mono % (Auto)  8.2 % (0.0-10.0)   04/02/18  08:00    


 


Eos % (Auto)  0.8 % (0.0-4.0)   04/02/18  08:00    


 


Baso % (Auto)  0.7 % (0.0-2.0)   04/02/18  08:00    


 


Neut # (Auto)  3.5 K/uL (1.8-7.0)   04/02/18  08:00    


 


Lymph # (Auto)  1.2 K/uL (1.0-4.3)   04/02/18  08:00    


 


Mono # (Auto)  0.4 K/uL (0.0-0.8)   04/02/18  08:00    


 


Eos # (Auto)  0.0 K/uL (0.0-0.7)   04/02/18  08:00    


 


Baso # (Auto)  0.0 K/uL (0.0-0.2)   04/02/18  08:00    


 


PT  12.7 Seconds (9.8-13.1)   04/01/18  08:29    


 


INR  1.1  (0.9-1.2)   04/01/18  08:29    


 


APTT  29.7 Seconds (25.6-37.1)   04/01/18  08:29    


 


D-Dimer, Quantitative  247 ng/mlDDU (0-230)  H  03/31/18  03:00    


 


Sodium  142 mmol/l (132-148)   04/05/18  05:30    


 


Potassium  4.1 MMOL/L (3.6-5.0)   04/05/18  05:30    


 


Chloride  103 mmol/L ()   04/05/18  05:30    


 


Carbon Dioxide  27 mmol/L (22-30)   04/05/18  05:30    


 


Anion Gap  16  (10-20)   04/05/18  05:30    


 


BUN  8 mg/dl (7-17)   04/05/18  05:30    


 


Creatinine  0.8 mg/dl (0.7-1.2)   04/05/18  05:30    


 


Est GFR ( Amer)  > 60   04/05/18  05:30    


 


Est GFR (Non-Af Amer)  > 60   04/05/18  05:30    


 


Random Glucose  95 mg/dL ()   04/05/18  05:30    


 


Calcium  9.2 mg/dL (8.4-10.2)   04/05/18  05:30    


 


Phosphorus  3.1 mg/dl (2.5-4.5)   03/31/18  03:00    


 


Magnesium  1.8 MG/DL (1.6-2.3)   03/31/18  03:00    


 


Total Bilirubin  1.8 mg/dl (0.2-1.3)  H  04/05/18  05:30    


 


Direct Bilirubin  3.8 mg/ml (0.0-0.4)  H  04/03/18  13:52    


 


AST  134 U/L (14-36)  H D 04/05/18  05:30    


 


ALT  253 U/L (9-52)  H  04/05/18  05:30    


 


Alkaline Phosphatase  244 U/L ()  H  04/05/18  05:30    


 


Total Protein  7.7 G/DL (6.3-8.2)   04/05/18  05:30    


 


Albumin  3.5 g/dL (3.5-5.0)   04/05/18  05:30    


 


Globulin  4.2 gm/dL (2.2-3.9)  H  04/05/18  05:30    


 


Albumin/Globulin Ratio  0.8  (1.0-2.1)  L  04/05/18  05:30    


 


Urine Color  Yellow  (YELLOW)   03/31/18  06:17    


 


Urine Clarity  Slighty-cloudy  (Clear)   03/31/18  06:17    


 


Urine pH  6.0  (5.0-8.0)   03/31/18  06:17    


 


Ur Specific Gravity  1.028  (1.003-1.030)   03/31/18  06:17    


 


Urine Protein  30 mg/dL (NEGATIVE)   03/31/18  06:17    


 


Urine Glucose (UA)  Neg mg/dL (Normal)   03/31/18  06:17    


 


Urine Ketones  Negative mg/dL (NEGATIVE)   03/31/18  06:17    


 


Urine Blood  Small  (NEGATIVE)   03/31/18  06:17    


 


Urine Nitrate  Negative  (NEGATIVE)   03/31/18  06:17    


 


Urine Bilirubin  Negative  (NEGATIVE)   03/31/18  06:17    


 


Urine Urobilinogen  0.2-1.0 mg/dL (0.2-1.0)   03/31/18  06:17    


 


Ur Leukocyte Esterase  Neg Zulay/uL (Negative)   03/31/18  06:17    


 


Urine RBC (Auto)  5 /hpf (0-3)  H  03/31/18  06:17    


 


Urine Microscopic WBC  6 /hpf (0-5)  H  03/31/18  06:17    


 


Ur Squamous Epith Cells  3 /hpf (0-5)   03/31/18  06:17    


 


Urine Bacteria  Rare  (<OCC)   03/31/18  06:17    


 


Urine HCG, Qual  Negative  (NEGATIVE)   04/03/18  19:10    


 


Urine Opiates Screen  Negative  (NEGATIVE)   03/31/18  03:00    


 


Urine Methadone Screen  Negative  (NEGATIVE)   03/31/18  03:00    


 


Ur Barbiturates Screen  Negative  (NEGATIVE)   03/31/18  03:00    


 


Ur Phencyclidine Scrn  Negative  (NEGATIVE)   03/31/18  03:00    


 


Ur Amphetamines Screen  Negative  (NEGATIVE)   03/31/18  03:00    


 


U Benzodiazepines Scrn  Negative  (NEGATIVE)   03/31/18  03:00    


 


U Oth Cocaine Metabols  Negative  (NEGATIVE)   03/31/18  03:00    


 


U Cannabinoids Screen  Negative  (NEGATIVE)   03/31/18  03:00    


 


Alcohol, Quantitative  < 10 mg/dl (0-10)   03/31/18  03:00    


 


IgG  1353.0 mg/dL (700.0-1600.0)   04/03/18  13:52    


 


ANSELMO Screen  Negative  (Negative)   04/03/18  13:52    


 


ASNELMO Titer  TEST NOT PERFORMED   04/03/18  13:52    


 


ANSELMO Titer 2  TEST NOT PERFORMED   04/03/18  13:52    


 


ANSELMO Pattern  TEST NOT PERFORMED   04/03/18  13:52    


 


ANSELMO Pattern 2  TEST NOT PERFORMED   04/03/18  13:52    


 


Anti-Mitochondrial Ab  Negative  (Negative)   04/03/18  13:52    


 


Hepatitis A IgM Ab  Negative  (NEGATIVE)   04/03/18  18:19    


 


Hep Bs Antigen  Negative  (NEGATIVE)   04/03/18  18:19    


 


Hep B Core IgM Ab  Negative  (NEGATIVE)   04/03/18  18:19    


 


Hepatitis C Antibody  Negative  (NEGATIVE)   04/03/18  18:19    














- Hospital Course


Hospital Course: 





28 year old female who was admitted with RUQ pain, noted to have acute 

cholecystitis. The patient underwent a laparoscopic cholecystectomy. After 

surgery, the pt continued to have elevation in total bilirubin and LFTs. The 

patient subsequently had ERCP yesterday with stone extraction. There is a 

downward trending in T-bili and LFTs today. The patient is doing well and was 

discharged home with outpatient f/u instructions





Discharge Exam





- Head Exam


Head Exam: ATRAUMATIC, NORMOCEPHALIC





- Respiratory Exam


Respiratory Exam: Clear to PA & Lateral, NORMAL BREATHING PATTERN





- Cardiovascular Exam


Cardiovascular Exam: REGULAR RHYTHM, +S1, +S2





- GI/Abdominal Exam


GI & Abdominal Exam: Normal Bowel Sounds, Soft





- Neurological Exam


Neurological exam: Alert, Oriented x3





- Psychiatric Exam


Psychiatric exam: Normal Affect, Normal Mood





- Skin


Skin Exam: Normal Color, Warm





Discharge Plan





- Discharge Medications


Prescriptions: 


oxyCODONE/Acetaminophen [Percocet 5/325 mg Tab] 1 tab PO Q4 PRN #14 tab


 PRN Reason: Pain, Moderate (4-7)


Pantoprazole Sodium [Protonix] 40 mg PO DAILY #14 ect





- Follow Up Plan


Condition: GOOD


Disposition: HOME/ ROUTINE


Instructions:  Cholecystectomy, Laparoscopic Surgery, Gallstones (DC), 

Endoscopic Retrograde Cholangiopancreatography (DC)


Additional Instructions: 


pt. cleared for discharge today by , and 


Rx for meds provided











PMD: Dr. Brodie Moses


follow up with Dr. Chacko 1 week


Referrals: 


Shaji Keene MD [Medical Doctor] - 


Gabino Chacko MD [Staff Provider] -

## 2018-04-06 NOTE — RAD
PROCEDURE:  Intraoperative fluoroscopy



HISTORY:

CBD STONE



COMPARISON:

Not available



TECHNIQUE:

Intraoperative fluoroscopy was provided for cholangiogram. Total time 

of fluoroscopy was 74.5 seconds.



FINDINGS:

Multiple fluoroscopic spot films are submitted. These demonstrate 

contrast material within the common bile duct and biliary tree. No 

filling defect is demonstrated.



IMPRESSION:

Fluoroscopy provided.

## 2018-04-07 VITALS — OXYGEN SATURATION: 99 %

## 2018-04-13 NOTE — OP
PROCEDURE DATE:  04/02/2018



PREOPERATIVE DIAGNOSES:  Acute on chronic cholecystitis and lithiasis.



POSTOPERATIVE DIAGNOSES:  Acute on chronic cholecystitis and lithiasis.



OPERATION PERFORMED:  Laparoscopic cholecystectomy and intraoperative

cholangiogram.



SURGEON:  Gabino Chacko MD



ASSISTANTS:  Dr. Javier Quintero and Dr. Fransisco Hernandez



ESTIMATED BLOOD LOSS:  Minimal.



DESCRIPTION OF PROCEDURE:  In the operating room, the patient was

identified by name, name of procedure, laterality, my aly.  The abdomen

was prepped with chlorhexidine after being clipped, after waiting for the

prep to dry, the area was draped and after the successful time-out the

operation proceeded.  The supraumbilical incision was made through the skin

and subcutaneous tissues.  It was dissected down to the fascia.  A Veress

needle was inserted without issue and without delay.  The Visiport was

placed after the successful cannulation and it was placed without issue.  A

xiphoid 5 and two lateral 5s were placed.  Drawing up in the fundus and

infundibulum, the dissection was rather quick into the point.  The cystic

duct and artery were rapidly identified.  Cystic duct was clipped on the

gallbladder site, open cholangiogram obtained, which showed no stone. 

Cystic duct was then doubly clipped and divided as was the artery.  This

was done after the view of safety was achieved and the posterior dissection

was _____.  The cystic artery was doubly clipped, divided and the

gallbladder taken off the liver bed.  Identifying posterior artery that was

clipped and cauterized.  Gallbladder was removed after the liver bed placed

into a bag and removed without issue.  The abdomen was copiously irrigated

and dried.  There was nothing untoward, nothing bleeding, bile and the

intestines were intact as was the stomach and duodenum.  The CO2 was

removed.  The incisions were closed with Vicryl on a  needle at the

umbilicus and all the incisions were closed with Vicryl, subcuticular PDS

and Dermabond.  The patient was taken to recovery room in good condition

after the sponge and needle counts were declared correct.







__________________________________________

Gabino Chacko MD





DD:  04/13/2018 8:45:10

DT:  04/13/2018 10:09:55

Job # [id number]

## 2018-10-02 ENCOUNTER — HOSPITAL ENCOUNTER (EMERGENCY)
Dept: HOSPITAL 14 - H.ER | Age: 28
Discharge: HOME | End: 2018-10-02
Payer: MEDICAID

## 2018-10-02 VITALS — BODY MASS INDEX: 43.1 KG/M2

## 2018-10-02 VITALS
SYSTOLIC BLOOD PRESSURE: 132 MMHG | HEART RATE: 69 BPM | DIASTOLIC BLOOD PRESSURE: 93 MMHG | RESPIRATION RATE: 16 BRPM | TEMPERATURE: 97.7 F

## 2018-10-02 VITALS — OXYGEN SATURATION: 100 %

## 2018-10-02 DIAGNOSIS — J02.9: Primary | ICD-10-CM

## 2018-10-02 NOTE — ED PDOC
HPI: CCC, URI, Sore Throat


Time Seen by Provider: 10/02/18 02:58


Chief Complaint (Nursing): ENT Problem


History Per: Patient


History/Exam Limitations: no limitations


Onset/Duration Of Symptoms: Mins


Additional Complaint(s): 


Patient was eating dry crackers and then felt throat pain, had pain with eating 

the rest of her dinner.  Was able to drink normally.  No vomiting, no throat 

closure sensation, able to tolerate liquids, no foreign body sensation. 





Past Medical History


Reviewed: Historical Data, Nursing Documentation, Vital Signs


Vital Signs: 





                                Last Vital Signs











Temp  98.2 F   10/02/18 02:34


 


Pulse  77   10/02/18 02:34


 


Resp  18   10/02/18 02:34


 


BP  156/97 H  10/02/18 02:34


 


Pulse Ox  100   10/02/18 02:34














- Medical History


PMH: Depression, Gall Bladder Disease, Sleep Apnea


   Denies: Colonic Polyps, Diabetes, HTN, Chronic Kidney Disease, Seizures, TIA





- Surgical History


Surgical History: 


   Denies: Endoscopy





- Family History


Family History: States: Unknown Family Hx





- Home Medications


Home Medications: 


                                Ambulatory Orders











 Medication  Instructions  Recorded


 


Pantoprazole Sodium [Protonix] 40 mg PO DAILY #14 ect 04/05/18


 


oxyCODONE/Acetaminophen [Percocet 1 tab PO Q4 PRN #14 tab 04/05/18





5/325 mg Tab]  














- Allergies


Allergies/Adverse Reactions: 


                                    Allergies











Allergy/AdvReac Type Severity Reaction Status Date / Time


 


No Known Allergies Allergy   Verified 10/02/18 02:34














Review of Systems


ROS Statement: Except As Marked, All Systems Reviewed And Found Negative


Review Of Systems: ROS cannot be obtained secondary to pt's inabilty to answer 

questions.


ENT: Positive for: Throat Pain





Physical Exam





- Reviewed


Nursing Documentation Reviewed: Yes


Vital Signs Reviewed: Yes





- Physical Exam


Appears: Positive for: Well, Non-toxic, No Acute Distress


Head Exam: Positive for: ATRAUMATIC, NORMAL INSPECTION, NORMOCEPHALIC


Skin: Positive for: Normal Color, Warm, DRY


Eye Exam: Positive for: EOMI, Normal appearance, PERRL


ENT: Positive for: Normal ENT Inspection, Pharynx Is (normal)


Neck: Positive for: Normal, Painless ROM


Cardiovascular/Chest: Positive for: Regular Rate, Rhythm


Respiratory: Positive for: CNT, Normal Breath Sounds


Gastrointestinal/Abdominal: Positive for: Normal Exam, Soft


Back: Positive for: Normal Inspection


Extremity: Positive for: Normal ROM


Neurologic/Psych: Positive for: Alert, Oriented





- ECG


O2 Sat by Pulse Oximetry: 100


Pulse Ox Interpretation: Normal





Medical Decision Making


Medical Decision Making: 





Patient with likely abrasion to esophagus from hard food


--Tolerating po, very well appearing


--Recommended supportive care only








Disposition





- Clinical Impression


Clinical Impression: 


 Throat pain








- Disposition


Referrals: 


[facility] [Outside]


Disposition: Routine/Home


Disposition Time: 04:00


Condition: STABLE


Instructions:  Sore Throat in Adults


Forms:  [facility] (English)

## 2019-04-02 ENCOUNTER — HOSPITAL ENCOUNTER (EMERGENCY)
Dept: HOSPITAL 14 - H.ER | Age: 29
Discharge: HOME | End: 2019-04-02
Payer: MEDICAID

## 2019-04-02 VITALS — SYSTOLIC BLOOD PRESSURE: 139 MMHG | OXYGEN SATURATION: 99 % | DIASTOLIC BLOOD PRESSURE: 75 MMHG

## 2019-04-02 VITALS — RESPIRATION RATE: 18 BRPM | TEMPERATURE: 98 F | HEART RATE: 81 BPM

## 2019-04-02 VITALS — BODY MASS INDEX: 43.1 KG/M2

## 2019-04-02 DIAGNOSIS — K29.70: Primary | ICD-10-CM

## 2019-04-02 LAB
ALBUMIN SERPL-MCNC: 4 G/DL (ref 3.5–5)
ALBUMIN/GLOB SERPL: 1 {RATIO} (ref 1–2.1)
ALT SERPL-CCNC: 91 U/L (ref 9–52)
AST SERPL-CCNC: 66 U/L (ref 14–36)
BACTERIA #/AREA URNS HPF: (no result) /[HPF]
BASOPHILS # BLD AUTO: 0 K/UL (ref 0–0.2)
BASOPHILS NFR BLD: 0.6 % (ref 0–2)
BILIRUB UR-MCNC: NEGATIVE MG/DL
BUN SERPL-MCNC: 14 MG/DL (ref 7–17)
CALCIUM SERPL-MCNC: 9.1 MG/DL (ref 8.4–10.2)
COLOR UR: YELLOW
EOSINOPHIL # BLD AUTO: 0 K/UL (ref 0–0.7)
EOSINOPHIL NFR BLD: 0.4 % (ref 0–4)
ERYTHROCYTE [DISTWIDTH] IN BLOOD BY AUTOMATED COUNT: 12.9 % (ref 11.5–14.5)
GFR NON-AFRICAN AMERICAN: > 60
GLUCOSE UR STRIP-MCNC: (no result) MG/DL
HGB BLD-MCNC: 12.5 G/DL (ref 12–16)
LEUKOCYTE ESTERASE UR-ACNC: (no result) LEU/UL
LIPASE SERPL-CCNC: 101 U/L (ref 23–300)
LYMPHOCYTES # BLD AUTO: 2.1 K/UL (ref 1–4.3)
LYMPHOCYTES NFR BLD AUTO: 34.6 % (ref 20–40)
MCH RBC QN AUTO: 29.6 PG (ref 27–31)
MCHC RBC AUTO-ENTMCNC: 33.4 G/DL (ref 33–37)
MCV RBC AUTO: 88.4 FL (ref 81–99)
MONOCYTES # BLD: 0.5 K/UL (ref 0–0.8)
MONOCYTES NFR BLD: 8.4 % (ref 0–10)
NEUTROPHILS # BLD: 3.4 K/UL (ref 1.8–7)
NEUTROPHILS NFR BLD AUTO: 56 % (ref 50–75)
NRBC BLD AUTO-RTO: 0.1 % (ref 0–0)
PH UR STRIP: 5 [PH] (ref 5–8)
PLATELET # BLD: 278 K/UL (ref 130–400)
PMV BLD AUTO: 8 FL (ref 7.2–11.7)
PROT UR STRIP-MCNC: 100 MG/DL
RBC # BLD AUTO: 4.24 MIL/UL (ref 3.8–5.2)
RBC # UR STRIP: (no result) /UL
SP GR UR STRIP: 1.03 (ref 1–1.03)
SQUAMOUS EPITHIAL: 16 /HPF (ref 0–5)
URINE CLARITY: (no result)
UROBILINOGEN UR-MCNC: (no result) MG/DL (ref 0.2–1)
WBC # BLD AUTO: 6.1 K/UL (ref 4.8–10.8)

## 2019-04-02 PROCEDURE — 80053 COMPREHEN METABOLIC PANEL: CPT

## 2019-04-02 PROCEDURE — 81003 URINALYSIS AUTO W/O SCOPE: CPT

## 2019-04-02 PROCEDURE — 96375 TX/PRO/DX INJ NEW DRUG ADDON: CPT

## 2019-04-02 PROCEDURE — 96374 THER/PROPH/DIAG INJ IV PUSH: CPT

## 2019-04-02 PROCEDURE — 83690 ASSAY OF LIPASE: CPT

## 2019-04-02 PROCEDURE — 81025 URINE PREGNANCY TEST: CPT

## 2019-04-02 PROCEDURE — 99283 EMERGENCY DEPT VISIT LOW MDM: CPT

## 2019-04-02 PROCEDURE — 85025 COMPLETE CBC W/AUTO DIFF WBC: CPT

## 2019-04-02 NOTE — ED PDOC
HPI: Abdomen


Time Seen by Provider: 19 19:49


Chief Complaint (Nursing): Abdominal Pain


Chief Complaint (Provider): Abdominal Pain


History Per: Patient


History/Exam Limitations: no limitations


Onset/Duration Of Symptoms: Days (x 7)


Current Symptoms Are (Timing): Still Present


Location Of Pain/Discomfort: Epigastric


Quality Of Discomfort: "Pain"


Associated Symptoms: Diarrhea


Alleviating Factors: OTC Meds (pepcid)


Additional Complaint(s): 


29 year old female with a medical history of "thyroid" and psoriasis presents to

the ED for evaluation of diarrhea and epigastric abdominal pain for one week. 

Patient reports pain initially improved with Pepcid. However, symptoms returned 

today, prompting ED visit. She denies other complaints or associated symptoms. 





PMD: Dr. Brodie Moses 








Past Medical History


Reviewed: Historical Data, Nursing Documentation, Vital Signs


Vital Signs: 





                                Last Vital Signs











Temp  98 F   19 19:19


 


Pulse  81   19 19:19


 


Resp  18   19 19:19


 


BP  132/74   19 19:19


 


Pulse Ox  99   19 19:19














- Medical History


PMH: Depression, Gall Bladder Disease, Sleep Apnea


   Denies: Colonic Polyps, Diabetes, HTN, Chronic Kidney Disease, Seizures, TIA


Other PMH: "thyroid" and psoriasis





- Surgical History


Surgical History: Cholecystectomy


   Denies: Endoscopy





- Family History


Family History: States: Unknown Family Hx





- Home Medications


Home Medications: 


                                Ambulatory Orders











 Medication  Instructions  Recorded


 


Pantoprazole Sodium [Protonix] 40 mg PO DAILY #14 ect 18


 


oxyCODONE/Acetaminophen [Percocet 1 tab PO Q4 PRN #14 tab 18





5/325 mg Tab]  


 


Ranitidine HCl [Zantac 75] 75 mg PO DAILY #30 tablet 19














- Allergies


Allergies/Adverse Reactions: 


                                    Allergies











Allergy/AdvReac Type Severity Reaction Status Date / Time


 


No Known Allergies Allergy   Verified 19 19:19














Review of Systems


ROS Statement: Except As Marked, All Systems Reviewed And Found Negative


Constitutional: Negative for: Fever, Chills


Gastrointestinal: Positive for: Abdominal Pain (epigastric), Diarrhea.  Negative

for: Nausea, Vomiting





Physical Exam





- Reviewed


Nursing Documentation Reviewed: Yes


Vital Signs Reviewed: Yes





- Physical Exam


Appears: Positive for: No Acute Distress


Head Exam: Positive for: ATRAUMATIC, NORMAL INSPECTION, NORMOCEPHALIC


Skin: Positive for: Normal Color, Warm, Dry


Eye Exam: Positive for: EOMI, Normal appearance, PERRL


Neck: Positive for: Normal, Painless ROM, Supple


Cardiovascular/Chest: Positive for: Regular Rate, Rhythm.  Negative for: Murmur


Respiratory: Positive for: Normal Breath Sounds.  Negative for: Respiratory 

Distress


Gastrointestinal/Abdominal: Positive for: Soft, Tenderness (mild epigastric).  

Negative for: Mass, Guarding, Rebound


Back: Positive for: Normal Inspection.  Negative for: L CVA Tenderness, R CVA 

Tenderness


Extremity: Positive for: Normal ROM (x 4).  Negative for: Deformity


Neurological/Psych: Positive for: Awake, Alert, Normal Tone, Oriented (x 3).  

Negative for: Motor/Sensory Deficits





- Laboratory Results


Result Diagrams: 


                                 19 20:50





                                 19 20:50


Lab Results: 





                                        











Total Bilirubin  0.4 mg/dl (0.2-1.3)   19  20:50    


 


AST  66 U/L (14-36)  H D 19  20:50    


 


ALT  91 U/L (9-52)  H D 19  20:50    


 


Alkaline Phosphatase  91 U/L ()   19  20:50    


 


Total Protein  7.9 G/DL (6.3-8.2)   19  20:50    


 


Albumin  4.0 g/dL (3.5-5.0)   19  20:50    


 


Globulin  3.8 gm/dL (2.2-3.9)   19  20:50    


 


Albumin/Globulin Ratio  1.0  (1.0-2.1)   19  20:50    








                                        











Lipase  101 U/L ()   19  20:50    








                                        











Urine Color  Yellow  (YELLOW)   19  20:50    


 


Urine Clarity  Cloudy  (Clear)   19  20:50    


 


Urine pH  5.0  (5.0-8.0)   19  20:50    


 


Ur Specific Gravity  1.034  (1.003-1.030)  H  19  20:50    


 


Urine Protein  100 mg/dL (NEGATIVE)   19  20:50    


 


Urine Glucose (UA)  Neg mg/dL (NEGATIVE)   19  20:50    


 


Urine Ketones  Negative mg/dL (NEGATIVE)   19  20:50    


 


Urine Blood  Moderate  (NEGATIVE)   19  20:50    


 


Urine Nitrate  Negative  (NEGATIVE)   19  20:50    


 


Urine Bilirubin  Negative  (NEGATIVE)   19  20:50    


 


Urine Urobilinogen  0.2-1.0 mg/dL (0.2-1.0)   19  20:50    


 


Ur Leukocyte Esterase  Neg Zulay/uL (Negative)   19  20:50    


 


Urine RBC (Auto)  13 /hpf (0-3)  H  19  20:50    


 


Urine Microscopic WBC  5 /hpf (0-5)   19  20:50    


 


Ur Squamous Epith Cells  16 /hpf (0-5)  H  19  20:50    


 


Urine Bacteria  Rare  (<OCC)   19  20:50    














- ECG


O2 Sat by Pulse Oximetry: 99 (RA)


Pulse Ox Interpretation: Normal





Medical Decision Making


Medical Decision Makin:03 


MDM: workup for gastritis vs other abdominal pathologies


Labs, UA, GI cocktail and IV fluids 


Reassess





23:15 


Patient reports complete resolution of symptoms after GI cocktail and IV fluids.




Labs show elevated LFTs. Patient is aware and will be discharged home. 


She has an appointment with her PMD on  and will discuss elevated liver 

tests. 


Return parameters provided. 








 

--------------------------------------------------------------------------------


-----------------


Scribe Attestation:


Documented by Galina Gillette, acting as a scribe Aleksey Cruz MD 





Provider Scribe Attestation:


All medical record entries made by the Scribe were at my direction and 

personally dictated by me. I have reviewed the chart and agree that the record 

accurately reflects my personal performance of the history, physical exam, 

medical decision making, and the department course for this patient. I have also

personally directed, reviewed, and agree with the discharge instructions and 

disposition. 





Disposition





- Clinical Impression


Clinical Impression: 


 Gastritis








- Patient ED Disposition


Is Patient to be Admitted: No





- Disposition


Disposition: Routine/Home


Disposition Time: 23:15


Condition: IMPROVED


Additional Instructions: 


Follow up with primary medical doctor as scheduled for  to discuss 

elevated liver enzymes.  Continue to take all home medications as prescribed in 

addition to the new medication for gastritis.  Return to the emergency 

department if symptoms worsen or if new symptoms develop.


Prescriptions: 


Ranitidine HCl [Zantac 75] 75 mg PO DAILY #30 tablet


Instructions:  Gastritis (DC)


Forms:  [facility] [facility] (English), [facility] ED School/Work Excuse


Print Language: ENGLISH